# Patient Record
Sex: OTHER/UNKNOWN | Race: WHITE | NOT HISPANIC OR LATINO | Employment: PART TIME | URBAN - METROPOLITAN AREA
[De-identification: names, ages, dates, MRNs, and addresses within clinical notes are randomized per-mention and may not be internally consistent; named-entity substitution may affect disease eponyms.]

---

## 2022-10-12 ENCOUNTER — TELEPHONE (OUTPATIENT)
Dept: PLASTIC SURGERY | Facility: CLINIC | Age: 32
End: 2022-10-12

## 2022-10-12 NOTE — TELEPHONE ENCOUNTER
[facility] :  Care Coordination Note     SITUATION   Angy Lino (she/her) is a 32 year old adult who is receiving support for:  Care Team  .    BACKGROUND     Pt is scheduled for a breast augmentation consult with Dr. Real on 01/11/23.    Pt's father, Nathan Lino, made the appointment and is helping coordinate care. He reports that pt is on the autism spectrum. Pt is also interested in bottom surgery. Writer discussed orchiectomy and vaginoplasty, as well as LOS. Writer added pt to the vaginoplasty waitlist.     ASSESSMENT     Surgery              CGC Assessment  Comprehensive Gender Care ([facility]) Enrollment: Enrolled  Patient has a therapist: Yes  Letter of support #1: Requested  Surgery being considered: Yes  Augmentation: Yes  Hormones at least 12mo: Yes          PLAN          Nursing Interventions:  @FLOW([id number]::1:2)@    Follow-up plan:    1. Obtain AMIE Pack

## 2022-10-13 NOTE — TELEPHONE ENCOUNTER
FUTURE VISIT INFORMATION      FUTURE VISIT INFORMATION:    Date: 1/11/22    Time: 1:30pm    Location: [facility]  REFERRAL INFORMATION:    Reason for visit/diagnosis  gender affirming breast augmentation    RECORDS REQUESTED FROM:       No recs to collect

## 2022-12-07 ENCOUNTER — TELEPHONE (OUTPATIENT)
Dept: PLASTIC SURGERY | Facility: CLINIC | Age: 32
End: 2022-12-07

## 2022-12-07 NOTE — CONFIDENTIAL NOTE
Writer called to schedule vaginoplasty consult, since pt is next on Dr. Alexander's list for new consults. Angy needs to coordinate with family for rides, so she will call back to schedule.

## 2022-12-27 ENCOUNTER — TELEPHONE (OUTPATIENT)
Dept: PLASTIC SURGERY | Facility: CLINIC | Age: 32
End: 2022-12-27

## 2022-12-27 DIAGNOSIS — F64.0 GENDER DYSPHORIA IN ADULT: Primary | ICD-10-CM

## 2022-12-27 NOTE — CONFIDENTIAL NOTE
[facility] :  Care Coordination Note     SITUATION   Angy Lino (she/her) is a 32 year old adult who is receiving support for:  Care Team  .    BACKGROUND     Pt is scheduled for a vaginoplasty consult with Dr. Alexander on 08/15/23.     ASSESSMENT     Surgery              CGC Assessment  Comprehensive Gender Care ([facility]) Enrollment: Enrolled  Patient has a therapist: Yes  Letter of support #1: Requested  Letter of support #2: Requested  Surgery being considered: Yes  Augmentation: Yes  Vaginoplasty: Yes    Pt ended call before writer could do intake questions or ask about health insurance.       PLAN          Nursing Interventions:       Follow-up plan:    1. Obtain LOS  2. Start hair removal, if desired.        Lelo Pack

## 2023-01-11 ENCOUNTER — PRE VISIT (OUTPATIENT)
Dept: PLASTIC SURGERY | Facility: CLINIC | Age: 33
End: 2023-01-11

## 2023-01-11 ENCOUNTER — OFFICE VISIT (OUTPATIENT)
Dept: PLASTIC SURGERY | Facility: CLINIC | Age: 33
End: 2023-01-11
Payer: COMMERCIAL

## 2023-01-11 VITALS
TEMPERATURE: 97.8 F | HEART RATE: 86 BPM | WEIGHT: 178 LBS | OXYGEN SATURATION: 97 % | DIASTOLIC BLOOD PRESSURE: 84 MMHG | BODY MASS INDEX: 28.61 KG/M2 | HEIGHT: 66 IN | SYSTOLIC BLOOD PRESSURE: 148 MMHG

## 2023-01-11 DIAGNOSIS — F64.0 GENDER DYSPHORIA IN ADULT: Primary | ICD-10-CM

## 2023-01-11 PROCEDURE — 99204 OFFICE O/P NEW MOD 45 MIN: CPT | Performed by: STUDENT IN AN ORGANIZED HEALTH CARE EDUCATION/TRAINING PROGRAM

## 2023-01-11 RX ORDER — PROGESTERONE 200 MG/1
200 CAPSULE ORAL DAILY
COMMUNITY
Start: 2022-10-19

## 2023-01-11 RX ORDER — SYRINGE, DISPOSABLE, 1 ML
SYRINGE, EMPTY DISPOSABLE MISCELLANEOUS
COMMUNITY
Start: 2023-01-04

## 2023-01-11 RX ORDER — SPIRONOLACTONE 50 MG/1
50 TABLET, FILM COATED ORAL 2 TIMES DAILY
Status: ON HOLD | COMMUNITY
Start: 2022-10-19 | End: 2024-08-15

## 2023-01-11 RX ORDER — NEEDLES, DISPOSABLE 25GX5/8"
NEEDLE, DISPOSABLE MISCELLANEOUS
COMMUNITY
Start: 2022-12-06

## 2023-01-11 RX ORDER — NEEDLES, DISPOSABLE 25GX5/8"
NEEDLE, DISPOSABLE MISCELLANEOUS
COMMUNITY
Start: 2023-01-05

## 2023-01-11 RX ORDER — ESTRADIOL VALERATE 20 MG/ML
6 INJECTION INTRAMUSCULAR WEEKLY
COMMUNITY
Start: 2022-10-19

## 2023-01-11 ASSESSMENT — PAIN SCALES - GENERAL: PAINLEVEL: MILD PAIN (2)

## 2023-01-11 NOTE — NURSING NOTE
"Chief Complaint   Patient presents with     Consult     Angy, is being seen today for a consult regarding gender affirming breast aug.       Vitals:    01/11/23 1332   BP: (!) 148/84   BP Location: Left arm   Patient Position: Chair   Cuff Size: Adult Regular   Pulse: 86   Temp: 97.8  F (36.6  C)   TempSrc: Oral   SpO2: 97%   Weight: 80.7 kg (178 lb)   Height: 1.676 m (5' 6\")       Body mass index is 28.73 kg/m .      Radha Barrett LPN    "

## 2023-01-11 NOTE — LETTER
"1/11/2023       RE: Angy Lino  [address]  [facility] [address]       Dear Colleague,    Thank you for referring your patient, Angy Lino, to the [facility] PLASTIC AND RECONSTRUCTIVE SURGERY CLINIC [place] at [facility]. Please see a copy of my visit note below.    PRS    HPI: 32 trans female p/f chest feminization consult. Patient has been in her current gender role for >3 years. Estrogen therapy for 2-3 years with plateau in breast growth many months ago. Patient has autism and both supportive parents are in the room with the patient. She would like a breast augmentation to more proportionately feminize the chest. No breast masses, lumps, nipple discharge or armpit swelling. There is a family history of breast cancer with mother having diagnosis later in life, like in the 60s. Patient lives in the [facility]. She has supportive family including parents. She has excellent relationship with therapist of a few years and meets with the therapist weekly. Patient will be available for postoperative care. She brings a letter of support to be reviewed by our .     ROS: Negative, see HPI  PMH: Gender dysphoria, autism, anxiety  PSH: No prior surgeries to the breasts or chest  Medications: Aldactone, Prozac, Estradiol  Allergies: None  SH: Non-smoker, denies any tobacco or nicotine use  FH: No bleeding or clotting issues, or problems with anesthesia. Mother and maternal grandmother with late breast cancer diagnosis and no known genetic mutations.     Examination:  BP (!) 148/84 (BP Location: Left arm, Patient Position: Chair, Cuff Size: Adult Regular)   Pulse 86   Temp 97.8  F (36.6  C) (Oral)   Ht 1.676 m (5' 6\")   Wt 80.7 kg (178 lb)   SpO2 97%   BMI 28.73 kg/m    Nonlabored breathing  Not distressed  Bilateral breasts with no ptosis  No breast masses, lumps, nipple discharge, skin changes or axillary " lymphadenopathy  Breast measurements:  BW: 15 cm bilaterally  NIMF (on-stretch): 6 cm bilaterally    No breast screening imaging    A/P: 32 trans female p/f breast aug consult    -Patient is a good candidate for bilateral feminizing breast augmentation with use of permanent silicone gel implants placed in the dual-plane position  -Discussed at-length both patient goals as well as risks of the operation which include but are not limited to: infection, bleeding, pain, poor scarring, wound healing issues, implant malposition, implant rupture, animation deformity, double-bubble deformity, capsular contracture, CHARLES-ALCL, numbness, asymmetry and need for revisional surgery. Despite these risks, patient consents to and would like to proceed with scheduling for BILATERAL feminizing breast augmentation with use of permanent silicone gel implants.  -Obtained photo consent and preoperative photography today  -Confirm uploaded letter of support for chest surgery  -Will order implants  -Recommend preoperative screening mammography due to age in the setting of strong breast cancer family history  -Case request will be placed  -A total of 45 minutes of direct face-to-face counseling, chart review and documentation was performed with the patient during this encounter          Again, thank you for allowing me to participate in the care of your patient.      Sincerely,    Cam Real MD

## 2023-01-12 NOTE — PROGRESS NOTES
"PRS    HPI: 32 trans female p/f chest feminization consult. Patient has been in her current gender role for >3 years. Estrogen therapy for 2-3 years with plateau in breast growth many months ago. Patient has autism and both supportive parents are in the room with the patient. She would like a breast augmentation to more proportionately feminize the chest. No breast masses, lumps, nipple discharge or armpit swelling. There is a family history of breast cancer with mother having diagnosis later in life, like in the 60s. Patient lives in the [facility]. She has supportive family including parents. She has excellent relationship with therapist of a few years and meets with the therapist weekly. Patient will be available for postoperative care. She brings a letter of support to be reviewed by our .     ROS: Negative, see HPI  PMH: Gender dysphoria, autism, anxiety  PSH: No prior surgeries to the breasts or chest  Medications: Aldactone, Prozac, Estradiol  Allergies: None  SH: Non-smoker, denies any tobacco or nicotine use  FH: No bleeding or clotting issues, or problems with anesthesia. Mother and maternal grandmother with late breast cancer diagnosis and no known genetic mutations.     Examination:  BP (!) 148/84 (BP Location: Left arm, Patient Position: Chair, Cuff Size: Adult Regular)   Pulse 86   Temp 97.8  F (36.6  C) (Oral)   Ht 1.676 m (5' 6\")   Wt 80.7 kg (178 lb)   SpO2 97%   BMI 28.73 kg/m    Nonlabored breathing  Not distressed  Bilateral breasts with no ptosis  No breast masses, lumps, nipple discharge, skin changes or axillary lymphadenopathy  Breast measurements:  BW: 15 cm bilaterally  NIMF (on-stretch): 6 cm bilaterally    No breast screening imaging    A/P: 32 trans female p/f breast aug consult    -Patient is a good candidate for bilateral feminizing breast augmentation with use of permanent silicone gel implants placed in the dual-plane position  -Discussed at-length both patient " goals as well as risks of the operation which include but are not limited to: infection, bleeding, pain, poor scarring, wound healing issues, implant malposition, implant rupture, animation deformity, double-bubble deformity, capsular contracture, CHARLES-ALCL, numbness, asymmetry and need for revisional surgery. Despite these risks, patient consents to and would like to proceed with scheduling for BILATERAL feminizing breast augmentation with use of permanent silicone gel implants.  -Obtained photo consent and preoperative photography today  -Confirm uploaded letter of support for chest surgery  -Will order implants  -Recommend preoperative screening mammography due to age in the setting of strong breast cancer family history  -Case request will be placed  -A total of 45 minutes of direct face-to-face counseling, chart review and documentation was performed with the patient during this encounter    Cam Real MD, PhD

## 2023-01-23 ENCOUNTER — DOCUMENTATION ONLY (OUTPATIENT)
Dept: PLASTIC SURGERY | Facility: CLINIC | Age: 33
End: 2023-01-23
Payer: COMMERCIAL

## 2023-01-23 NOTE — PROGRESS NOTES
[facility] :  Care Coordination Note     SITUATION   Angy Lino is a 32 year old adult who is receiving support for:  No chief complaint on file.  .    BACKGROUND     Reviewed LOS. The letter from Ian would meet criteria, but it indicates it should be disregarded without the second letter from Prakash Rojas. That LOS is insufficient. Writer sent message to Angy's dad to request update from Prakash Rojas.     ASSESSMENT     Surgery              CGC Assessment  Comprehensive [facility] Care ([facility]) Enrollment: Enrolled  Patient has a therapist: Yes  Name of therapist: Ian Soriano  Letter of support #1: Received  Letter #1 Date: 07/01/22  Letter of support #2: Requested  Surgery being considered: Yes  Augmentation: Yes  Vaginoplasty: Yes          PLAN          Nursing Interventions:       Follow-up plan:  Pt to obtain and upload updated LOS. Surgery to be scheduled.        LIVIA MERCADO LPCC

## 2023-01-31 ENCOUNTER — HOSPITAL ENCOUNTER (OUTPATIENT)
Dept: MAMMOGRAPHY | Facility: CLINIC | Age: 33
Discharge: HOME | End: 2023-01-31
Attending: STUDENT IN AN ORGANIZED HEALTH CARE EDUCATION/TRAINING PROGRAM | Admitting: STUDENT IN AN ORGANIZED HEALTH CARE EDUCATION/TRAINING PROGRAM
Payer: COMMERCIAL

## 2023-01-31 DIAGNOSIS — F64.0 GENDER DYSPHORIA IN ADULT: ICD-10-CM

## 2023-01-31 PROCEDURE — 77067 SCR MAMMO BI INCL CAD: CPT

## 2023-02-12 ENCOUNTER — HEALTH MAINTENANCE LETTER (OUTPATIENT)
Age: 33
End: 2023-02-12

## 2023-05-10 ENCOUNTER — TELEPHONE (OUTPATIENT)
Dept: PLASTIC SURGERY | Facility: CLINIC | Age: 33
End: 2023-05-10
Payer: COMMERCIAL

## 2023-05-10 NOTE — CONFIDENTIAL NOTE
Pt's dad, Nathan, called to ask where Angy is in the process to scheduling top surgery. Angy saw Dr. Real on 01/11/23. Crow's previous note reviewing LOS states pt needs to update second LOS from Prakash Rojas because it is insufficient. Writer to email pt's dad at [email or web address] LOS requirements.

## 2023-05-11 ENCOUNTER — PATIENT OUTREACH (OUTPATIENT)
Dept: PLASTIC SURGERY | Facility: CLINIC | Age: 33
End: 2023-05-11
Payer: COMMERCIAL

## 2023-05-11 NOTE — PATIENT INSTRUCTIONS
Reached out to pt today to answer questions about scheduling surgery. Pt stated that she did not have questions but would like me to call her dad, Nathan to see if he has questions. I called Nathan and he let me know that he spoke with Lelo yesterday about updated LOS requirement and he has no further questions. They are working on obtaining a letter from therapist Prakash Rojas.  Jefferson WEST RN

## 2023-05-18 NOTE — TELEPHONE ENCOUNTER
FUTURE VISIT INFORMATION      FUTURE VISIT INFORMATION:    Date: 8/15/2023    Time: Noon    Location: [facility]  REFERRAL INFORMATION:    Referring provider: N/A    Referring providers clinic: N/A    Reason for visit/diagnosis: Vaginoplasty Consult    RECORDS REQUESTED FROM:       Clinic name Comments Records Status Imaging Status   [facility] 1/11/23 - PLASTIC OV with Dr. Real Pending sale to [facility] 4/19/23 - [facility] OV with BRITTNI Benavidez Care Everywhere

## 2023-06-01 ENCOUNTER — TELEPHONE (OUTPATIENT)
Dept: PLASTIC SURGERY | Facility: CLINIC | Age: 33
End: 2023-06-01
Payer: COMMERCIAL

## 2023-06-01 NOTE — CONFIDENTIAL NOTE
Pt's dad, Nathan, called to ask if Angy's updated LOS is in her chart yet. He stated that her provider sent it recently. Writer stated they do not see a recent LOS in pt's chart. Nathan stated he will have the provider send it again.

## 2023-06-02 ENCOUNTER — TELEPHONE (OUTPATIENT)
Dept: PLASTIC SURGERY | Facility: CLINIC | Age: 33
End: 2023-06-02
Payer: COMMERCIAL

## 2023-06-08 NOTE — TELEPHONE ENCOUNTER
Reviewed updated LOS. It meets criteria. Updated pt list and messaged surgeon and RNCC for CR. Once placed, surgery can be scheduled.

## 2023-06-12 ENCOUNTER — TELEPHONE (OUTPATIENT)
Dept: PLASTIC SURGERY | Facility: CLINIC | Age: 33
End: 2023-06-12
Payer: COMMERCIAL

## 2023-06-12 NOTE — TELEPHONE ENCOUNTER
Angy's dad called to check on LOS and progress toward surgery. Writer let him know our periop coordinator would reach out to find a surgery date in the next week or two for Dr. Real.

## 2023-06-15 ENCOUNTER — TELEPHONE (OUTPATIENT)
Dept: PLASTIC SURGERY | Facility: CLINIC | Age: 33
End: 2023-06-15
Payer: COMMERCIAL

## 2023-06-15 PROBLEM — F64.0 GENDER DYSPHORIA IN ADULT: Status: ACTIVE | Noted: 2023-06-15

## 2023-06-15 NOTE — TELEPHONE ENCOUNTER
RN Care Coordinator: Jefferson Vincent; [phone]    Surgery is scheduled with Dr. Real   Date: 7/20   [facility]  Scheduled per: next available date    H&P to be completed by Primary Care team; patient to schedule    Surgical consult: 7/12 with Dr. Real, [facility]    Post-op visit(s):   7/31 with Kendra Coley PA-C, [facility]       Patient will receive a phone call from pre-admission nurses 1-2 days prior to surgery with arrival and start time. Approximate arrival time/surgery time given to patient: 10 AM. Patient aware times are subject to change up until day before surgery.       Spoke with patient and her mother, Atiya, and was able to confirm all scheduled information.       Patient questions/concerns: Patient did get overwhelmed during scheduling call, so message noted for pre-op nurses that patient may want to conference her mother in to go over surgery times.       Surgery packet to be sent via US mail to mother, Atiya, per patient request    Atiya Lino  [address] [address]    __    Lissa Crook, Senior Perioperative Coordinator, on 6/15/2023 at 2:10 PM  P: [phone]

## 2023-06-15 NOTE — TELEPHONE ENCOUNTER
Spoke with patient regarding scheduling surgery with Dr. Real.    Patient requested a call back and ended the call.    Will try again later today.    __    Lissa Crook, Senior Perioperative Coordinator, on 6/15/2023 at 12:42 PM  P: [phone]

## 2023-07-06 DIAGNOSIS — F64.0 GENDER DYSPHORIA IN ADULT: Primary | ICD-10-CM

## 2023-07-06 NOTE — PROGRESS NOTES
Spoke to pt's father, Nathan today who requested assistance with getting pre-op H & P for upcoming surgery as he was unaware this was needed. PAC referral placed per request. Nathan will call today to set up pre-op appt and understands urgency as surgery is scheduled for 7/20.  Jefferson WEST RN

## 2023-07-07 ENCOUNTER — TELEPHONE (OUTPATIENT)
Dept: PLASTIC SURGERY | Facility: CLINIC | Age: 33
End: 2023-07-07
Payer: COMMERCIAL

## 2023-07-07 NOTE — CONFIDENTIAL NOTE
Writer received voicemail from pt's insurance  stating they had questions about upcoming procedure with Dr. Real. Writer called and pt's dad, Nathan, answered. Nathan stated they figured everything out on their end. No follow up needed.

## 2023-07-07 NOTE — TELEPHONE ENCOUNTER
FUTURE VISIT INFORMATION      SURGERY INFORMATION:    Date: 7/20/23    Location:  or    Surgeon:  Cam Real MD    Anesthesia Type:  general    Procedure: BILATERAL FEMINIZING BREAST AUGMENTATION WITH USE OF SMOOTH ROUND SILICONE IMPLANTS    RECORDS REQUESTED FROM:       Primary Care Provider: [facility] "[employer]"

## 2023-07-12 ENCOUNTER — OFFICE VISIT (OUTPATIENT)
Dept: PLASTIC SURGERY | Facility: CLINIC | Age: 33
End: 2023-07-12
Payer: COMMERCIAL

## 2023-07-12 ENCOUNTER — ANESTHESIA EVENT (OUTPATIENT)
Dept: SURGERY | Facility: AMBULATORY SURGERY CENTER | Age: 33
End: 2023-07-12
Payer: COMMERCIAL

## 2023-07-12 ENCOUNTER — PRE VISIT (OUTPATIENT)
Dept: SURGERY | Facility: CLINIC | Age: 33
End: 2023-07-12

## 2023-07-12 VITALS
BODY MASS INDEX: 28.83 KG/M2 | HEART RATE: 97 BPM | WEIGHT: 179.4 LBS | SYSTOLIC BLOOD PRESSURE: 127 MMHG | HEIGHT: 66 IN | DIASTOLIC BLOOD PRESSURE: 74 MMHG | OXYGEN SATURATION: 98 %

## 2023-07-12 DIAGNOSIS — F64.0 GENDER DYSPHORIA IN ADULT: Primary | ICD-10-CM

## 2023-07-12 PROCEDURE — 99213 OFFICE O/P EST LOW 20 MIN: CPT | Performed by: STUDENT IN AN ORGANIZED HEALTH CARE EDUCATION/TRAINING PROGRAM

## 2023-07-12 ASSESSMENT — PAIN SCALES - GENERAL: PAINLEVEL: NO PAIN (0)

## 2023-07-12 NOTE — LETTER
7/12/2023       RE: Angy Lino  [address] [place] [address]       Dear Colleague,    Thank you for referring your patient, Angy Lino, to the [facility] PLASTIC AND RECONSTRUCTIVE SURGERY CLINIC [place] at [facility]. Please see a copy of my visit note below.    PRS    Patient returns for preoperative visit.  Plan will be for bilateral feminizing breast augmentation in the dual plane position with silicone smooth round gel implants.  Patient is agreeable with the plan.     Reiterated risks of implants, including but not limited to: capsular contracture, implant rupture, implant malposition, double bubble deformity, animation deformity, CHARLES-ALCL, implant related illness. Reviewed the FDA checklist for implant related risks.  Patient understands these risks and is appreciative of the discussion.    Reiterated importance of implant surveillance, which includes MR imaging study 5-6 years after the original operation, followed by MR imaging every 3 years thereafter.      Discussed the typical activity restrictions following surgery.  For the first 2 weeks, limit lifting to 5 pounds and no activity that would elevate the heart rate for sustained periods of time above 100 bpm.  We may initiate upper pole strapping at 2 weeks.  Patient is to wear surgical bra at all times.  At the 6-week visit, we will increase activities usually to near full activity including swimming.  At 3 months postoperatively, patient can perform all activities including push-ups.    Discussed the risks of surgery, including but not limited to: Infection, bleeding, pain, poor scarring, hematoma, asymmetry, suboptimal aesthetic result, need for revision surgery, capsular contracture, implant rupture, implant malposition, double bubble deformity, animation deformity, CHARLES-ALCL, implant related illness.  Despite these risks, patient consents and would like to proceed  with bilateral feminizing breast augmentation in the partial submuscular or dual plane position using silicone smooth round gel implants.  All questions answered.    A total of 20 minutes was devoted to review of chart, direct face-to-face patient counseling and documentation during this encounter, exclusive of any procedure performed.          Again, thank you for allowing me to participate in the care of your patient.      Sincerely,    Cam Real MD

## 2023-07-12 NOTE — TELEPHONE ENCOUNTER
FUTURE VISIT INFORMATION        SURGERY INFORMATION:    Date: 7/20/23    Location:  or    Surgeon:  Cam Real MD    Anesthesia Type:  general    Procedure: BILATERAL FEMINIZING BREAST AUGMENTATION WITH USE OF SMOOTH ROUND SILICONE IMPLANTS     RECORDS REQUESTED FROM:         Primary Care Provider: [facility]

## 2023-07-12 NOTE — NURSING NOTE
"Chief Complaint   Patient presents with     Consult     Consult breast augmentation       Vitals:    07/12/23 1556   BP: 127/74   BP Location: Left arm   Patient Position: Sitting   Cuff Size: Adult Regular   Pulse: 97   SpO2: 98%   Weight: 179 lb 6.4 oz   Height: 5' 6\"       Body mass index is 28.96 kg/m .    Meenakshi Rogers CMA    "

## 2023-07-12 NOTE — PROGRESS NOTES
PRS    Patient returns for preoperative visit.  Plan will be for bilateral feminizing breast augmentation in the dual plane position with silicone smooth round gel implants.  Patient is agreeable with the plan.     Reiterated risks of implants, including but not limited to: capsular contracture, implant rupture, implant malposition, double bubble deformity, animation deformity, CHARLES-ALCL, implant related illness. Reviewed the FDA checklist for implant related risks.  Patient understands these risks and is appreciative of the discussion.    Reiterated importance of implant surveillance, which includes MR imaging study 5-6 years after the original operation, followed by MR imaging every 3 years thereafter.      Discussed the typical activity restrictions following surgery.  For the first 2 weeks, limit lifting to 5 pounds and no activity that would elevate the heart rate for sustained periods of time above 100 bpm.  We may initiate upper pole strapping at 2 weeks.  Patient is to wear surgical bra at all times.  At the 6-week visit, we will increase activities usually to near full activity including swimming.  At 3 months postoperatively, patient can perform all activities including push-ups.    Discussed the risks of surgery, including but not limited to: Infection, bleeding, pain, poor scarring, hematoma, asymmetry, suboptimal aesthetic result, need for revision surgery, capsular contracture, implant rupture, implant malposition, double bubble deformity, animation deformity, CHARLES-ALCL, implant related illness.  Despite these risks, patient consents and would like to proceed with bilateral feminizing breast augmentation in the partial submuscular or dual plane position using silicone smooth round gel implants.  All questions answered.    A total of 20 minutes was devoted to review of chart, direct face-to-face patient counseling and documentation during this encounter, exclusive of any procedure performed.    Cam  MD Addie, PhD

## 2023-07-14 ENCOUNTER — OFFICE VISIT (OUTPATIENT)
Dept: SURGERY | Facility: CLINIC | Age: 33
End: 2023-07-14
Payer: COMMERCIAL

## 2023-07-14 ENCOUNTER — PRE VISIT (OUTPATIENT)
Dept: SURGERY | Facility: CLINIC | Age: 33
End: 2023-07-14

## 2023-07-14 VITALS
HEIGHT: 66 IN | RESPIRATION RATE: 16 BRPM | SYSTOLIC BLOOD PRESSURE: 120 MMHG | TEMPERATURE: 97.9 F | WEIGHT: 179.9 LBS | BODY MASS INDEX: 28.91 KG/M2 | HEART RATE: 72 BPM | DIASTOLIC BLOOD PRESSURE: 82 MMHG | OXYGEN SATURATION: 94 %

## 2023-07-14 DIAGNOSIS — Z01.818 PREOP EXAMINATION: Primary | ICD-10-CM

## 2023-07-14 DIAGNOSIS — F64.0 GENDER DYSPHORIA IN ADULT: ICD-10-CM

## 2023-07-14 PROCEDURE — 99203 OFFICE O/P NEW LOW 30 MIN: CPT | Performed by: PHYSICIAN ASSISTANT

## 2023-07-14 RX ORDER — ACETAMINOPHEN 325 MG/1
325-650 TABLET ORAL EVERY 6 HOURS PRN
COMMUNITY

## 2023-07-14 ASSESSMENT — PAIN SCALES - GENERAL: PAINLEVEL: NO PAIN (0)

## 2023-07-14 ASSESSMENT — LIFESTYLE VARIABLES: TOBACCO_USE: 0

## 2023-07-14 ASSESSMENT — ENCOUNTER SYMPTOMS: SEIZURES: 0

## 2023-07-14 NOTE — PATIENT INSTRUCTIONS
Preparing for Your Surgery      Name:  Angy Lino   MRN:  [id number]   :  1990   Today's Date:  2023         Arriving for surgery:  Surgery date:  2023  Arrival time:  5:45 AM    Restrictions due to COVID 19:    Please maintain social distance.  Masking is optional        parking is available for anyone with mobility limitations or disabilities. (Monday- Friday 7 am- 5 pm)    Please come to:    [facility]  [address] [place] [id number]    Check in on the 5th floor, Ambulatory Surgery Center.    What can I eat or drink?    -  You may eat and drink normally until 8 hours before arrival time  (Until 9:45 PM )  -  You may have clear liquids until 2 hours before arrival time  (Until 3:45 AM)    Examples of clear liquids:  Water  Clear broth  Juices (apple, white grape, white cranberry  and cider) without pulp  Noncarbonated, powder based beverages  (lemonade and Brian-Aid)  Sodas (Sprite, 7-Up, ginger ale and seltzer)  Coffee or tea (without milk or cream)  Gatorade    --No alcohol or cannabis products for at least 24 hours before surgery    Which medicines can I take?    Hold Aspirin for 7 days before surgery.   Hold Multivitamins for 7 days before surgery.  Hold Supplements for 7 days before surgery.  Hold Ibuprofen (Advil, Motrin) for 1 day before surgery--unless otherwise directed by surgeon.  Hold Naproxen (Aleve) for 4 days before surgery.    -  DO NOT take the following medications the day of surgery:  Spironalactone    -  PLEASE TAKE the following medications the day of surgery   Tylenol if needed  Prozac  Progesterone    How do I prepare myself?  - Please take 2 showers (one the night prior to surgery and one the morning of surgery) using Scrubcare or Hibiclens soap.    Use this soap only from the neck to your toes.     Leave the soap on your skin for one minute--then rinse thoroughly.      You may use your own shampoo and conditioner; no  other hair products.   - Please remove all jewelry and body piercings.  - No lotions, deodorants or fragrance.  - No makeup or fingernail polish.   - Bring your ID and insurance card.    -If you have a Deep Brain Stimulator, a Spinal Cord Stimulator or any implanted Neuro device you must bring the remote to the Surgery Center          ALL PATIENTS ARE REQUIRED TO HAVE A RESPONSIBLE ADULT TO DRIVE AND BE IN ATTENDANCE WITH THEM FOR 24 HOURS FOLLOWING SURGERY       Covid testing policy as of 12/06/2022  Your surgeon will notify and schedule you for a COVID test if one is needed before surgery--please direct any questions or COVID symptoms to your surgeon      Questions or Concerns:    -For questions regarding the day of surgery please contact the Ambulatory Surgery Center at [phone].    -If you have health changes between today and your surgery please contact your surgeon.     For questions after surgery please call your surgeons office.

## 2023-07-14 NOTE — H&P
Pre-Operative H & P     CC:  Preoperative exam to assess for increased cardiopulmonary risk while undergoing surgery and anesthesia.    Date of Encounter: 7/14/2023  Primary Care Physician:  Cam Real     Reason for visit:   Encounter Diagnoses   Name Primary?     Preop examination Yes     Gender dysphoria in adult        HPI  Angy Lino is a 32 year old adult who presents for pre-operative H & P in preparation for  Procedure Information     Case: [id number] Date/Time: 07/20/23 0715    Procedure: BILATERAL FEMINIZING BREAST AUGMENTATION WITH USE OF SMOOTH ROUND SILICONE IMPLANTS (Bilateral: Breast)    Anesthesia type: General    Diagnosis: Gender dysphoria in adult [F64.0]    Pre-op diagnosis: Gender dysphoria in adult [F64.0]    Location: [place] [address] / [facility]    Providers: Cam Real MD          Ms. Lino is a 32-year-old transgender female with past medical history significant for autism spectrum disorder and anxiety.  To further align with her gender identity she has chosen to pursue the above procedure.    History is obtained from the patient and chart review    Hx of abnormal bleeding or anti-platelet use: Denies      Past Medical History  Past Medical History:   Diagnosis Date     Anxiety      Autism        Past Surgical History  No past surgical history on file.    Prior to Admission Medications  Current Outpatient Medications   Medication Sig Dispense Refill     acetaminophen (TYLENOL) 325 MG tablet Take 325-650 mg by mouth every 6 hours as needed for mild pain       estradiol valerate (DELESTROGEN) 20 MG/ML injection Inject 6 mg into the muscle once a week Mondays or Thursdays       FLUoxetine (PROZAC) 20 MG capsule Take 1 capsule by mouth every morning       progesterone (PROMETRIUM) 200 MG capsule Take 1 capsule by mouth 2 times daily       spironolactone (ALDACTONE) 50 MG tablet Take 50 mg by mouth 2 times daily        "B-D DISP NEEDLE 25G X 1\" MISC USE FOR WEEKLY INJECTION       B-D SYRINGE LUER-KAYLEIGH 1 ML MISC USE FOR WEEKLY ADMINISTRATION       BD DISP NEEDLES 18G X 1-1/2\" MISC USE ONCE WEEKLY         Allergies  No Known Allergies    Social History  Social History     Socioeconomic History     Marital status: Single     Spouse name: Not on file     Number of children: Not on file     Years of education: Not on file     Highest education level: Not on file   Occupational History     Not on file   Tobacco Use     Smoking status: Never     Smokeless tobacco: Never   Substance and Sexual Activity     Alcohol use: Not Currently     Drug use: Never     Sexual activity: Not on file   Other Topics Concern     Not on file   Social History Narrative     Not on file     Social Determinants of Health     Financial Resource Strain: Not on file   Food Insecurity: Not on file   Transportation Needs: Not on file   Physical Activity: Not on file   Stress: Not on file   Social Connections: Not on file   Intimate Partner Violence: Not on file   Housing Stability: Not on file       Family History  Family History   Problem Relation Age of Onset     Anesthesia Reaction No family hx of      Venous thrombosis No family hx of        Review of Systems  The complete review of systems is negative other than noted in the HPI or here.     Anesthesia Evaluation   Pt has not had prior anesthetic         ROS/MED HX  ENT/Pulmonary:  - neg pulmonary ROS  (-) tobacco use and asthma   Neurologic: Comment: Autism spectrum disorder   (-) no seizures and no CVA   Cardiovascular:  - neg cardiovascular ROS     METS/Exercise Tolerance: >4 METS    Hematologic:  - neg hematologic  ROS     Musculoskeletal:  - neg musculoskeletal ROS     GI/Hepatic:  - neg GI/hepatic ROS     Renal/Genitourinary:  - neg Renal ROS     Endo:  - neg endo ROS     Psychiatric/Substance Use:     (+) psychiatric history anxiety (panic attacks)     Infectious Disease:  - neg infectious disease ROS   " "  Malignancy:  - neg malignancy ROS     Other:  - neg other ROS          /82 (BP Location: Right arm, Patient Position: Sitting, Cuff Size: Adult Regular)   Pulse 72   Temp 97.9  F (36.6  C) (Oral)   Resp 16   Ht 1.676 m (5' 6\")   Wt 81.6 kg (179 lb 14.4 oz)   LMP  (LMP Unknown)   SpO2 94%   Breastfeeding No   BMI 29.04 kg/m      Physical Exam   Constitutional: Awake, alert, cooperative, no apparent distress, and appears stated age.  Eyes: Pupils equal, round and reactive to light, extra ocular muscles intact, sclera clear, conjunctiva normal.  HENT: Normocephalic, oral pharynx with moist mucus membranes, good dentition. No goiter appreciated.   Respiratory: Clear to auscultation bilaterally, no crackles or wheezing.  Cardiovascular: Regular rate and rhythm, normal S1 and S2, and no murmur noted.  No edema. Palpable pulses to radial and PT arteries.   GI: Not assessed  Lymph/Hematologic: No cervical lymphadenopathy and no supraclavicular lymphadenopathy.  Genitourinary:  deferred  Skin: Warm and dry.  No rashes at anticipated surgical site.   Musculoskeletal: Full ROM of neck. There is no redness, warmth, or swelling of the joints. Gross motor strength is normal.    Neurologic: Awake, alert, oriented to name, place and time. Cranial nerves II-XII are grossly intact.   Neuropsychiatric: Calm, cooperative. Normal affect.     Prior Labs/Diagnostic Studies   All labs and imaging personally reviewed     Outside labs 2/25/2022  Sodium 136 - 145 mmol/L 137  [facility]   Potassium 3.5 - 5.1 mmol/L 4.7  [facility]   Chloride 98 - 109 mmol/L 105  [facility]   CO2 20 - 29 mmol/L 23  [facility]   Anion Gap 7 - 16 mmol/L 9  [facility]   Calcium 8.4 - 10.4 mg/dL 9.0  [facility]   BUN 7 - 26 mg/dL 13  [facility]   Creatinine 0.55 - 1.18 mg/dL 0.69  [facility]   GFR, Estimated   [facility]   Comment: Unable to Calculate. " "Patient Age/Sex Unknown.   Glucose 70 - 100 mg/dL 78  [facility]   Comment: The given reference range is for the fasting state. Non-fasting reference range for glucose is 70 - 180 mg/dL.   Hours Fasting  4         EKG/ stress test - if available please see in ROS above       The patient's records and results personally reviewed by this provider.     Outside records reviewed from: Care Everywhere   Reviewed surgeon's clinic notes as well as outside primary care clinic notes    LAB/DIAGNOSTIC STUDIES TODAY: None    Assessment      Angy Lino is a 32 year old adult seen as a PAC referral for risk assessment and optimization for anesthesia.    Plan/Recommendations  Pt will be optimized for the proposed procedure.  See below for details on the assessment, risk, and preoperative recommendations    NEUROLOGY  - No history of TIA, CVA or seizure  -Autism spectrum disorder  -Post Op delirium risk factors:  No risk identified    ENT  - No current airway concerns.  Will need to be reassessed day of surgery.  Mallampati: II  TM: > 3    CARDIAC  - No history of CAD, Hypertension and Afib   -Denies  - METS (Metabolic Equivalents)  Patient performs 4 or more METS exercise without symptoms            Total Score: 0      RCRI-Very low risk: Class 1 0.4% complication rate            Total Score: 0        PULMONARY    ALBERTO Low Risk            Total Score: 0      - Denies asthma or inhaler use  - Tobacco History      History   Smoking Status     Never   Smokeless Tobacco     Never       GI  - Denies GERD  PONV High Risk  Total Score: 3           1 AN PONV: Pt is Female    1 AN PONV: Patient is not a current smoker    1 AN PONV: Intended Post Op Opioids        /RENAL  - Baseline Creatinine 0.69    ENDOCRINE    - BMI: Estimated body mass index is 29.04 kg/m  as calculated from the following:    Height as of this encounter: 1.676 m (5' 6\").    Weight as of this encounter: 81.6 kg (179 lb 14.4 oz).  Overweight (BMI " 25.0-29.9)  - No history of Diabetes Mellitus    HEME  VTE Low Risk 0.26%            Total Score: 0      - No history of abnormal bleeding or antiplatelet use.        PSYCH  -Anxiety, continue Prozac    Different anesthesia methods/types have been discussed with the patient, but they are aware that the final plan will be decided by the assigned anesthesia provider on the date of service.      The patient is optimized for their procedure. AVS with information on surgery time/arrival time, meds and NPO status given by nursing staff. No further diagnostic testing indicated.        Qi Perkins PA-C  Preoperative Assessment Center  [facility]  Clinic and Surgery Center  Phone: [phone]  Fax: [phone]

## 2023-07-14 NOTE — H&P (VIEW-ONLY)
Pre-Operative H & P     CC:  Preoperative exam to assess for increased cardiopulmonary risk while undergoing surgery and anesthesia.    Date of Encounter: 7/14/2023  Primary Care Physician:  Cam Real     Reason for visit:   Encounter Diagnoses   Name Primary?     Preop examination Yes     Gender dysphoria in adult        HPI  Angy Lino is a 32 year old adult who presents for pre-operative H & P in preparation for  Procedure Information     Case: [id number] Date/Time: 07/20/23 0715    Procedure: BILATERAL FEMINIZING BREAST AUGMENTATION WITH USE OF SMOOTH ROUND SILICONE IMPLANTS (Bilateral: Breast)    Anesthesia type: General    Diagnosis: Gender dysphoria in adult [F64.0]    Pre-op diagnosis: Gender dysphoria in adult [F64.0]    Location: [place] [address] / [facility]    Providers: Cam Real MD          Ms. Lino is a 32-year-old transgender female with past medical history significant for autism spectrum disorder and anxiety.  To further align with her gender identity she has chosen to pursue the above procedure.    History is obtained from the patient and chart review    Hx of abnormal bleeding or anti-platelet use: Denies      Past Medical History  Past Medical History:   Diagnosis Date     Anxiety      Autism        Past Surgical History  No past surgical history on file.    Prior to Admission Medications  Current Outpatient Medications   Medication Sig Dispense Refill     acetaminophen (TYLENOL) 325 MG tablet Take 325-650 mg by mouth every 6 hours as needed for mild pain       estradiol valerate (DELESTROGEN) 20 MG/ML injection Inject 6 mg into the muscle once a week Mondays or Thursdays       FLUoxetine (PROZAC) 20 MG capsule Take 1 capsule by mouth every morning       progesterone (PROMETRIUM) 200 MG capsule Take 1 capsule by mouth 2 times daily       spironolactone (ALDACTONE) 50 MG tablet Take 50 mg by mouth 2 times daily        "B-D DISP NEEDLE 25G X 1\" MISC USE FOR WEEKLY INJECTION       B-D SYRINGE LUER-KAYLEIGH 1 ML MISC USE FOR WEEKLY ADMINISTRATION       BD DISP NEEDLES 18G X 1-1/2\" MISC USE ONCE WEEKLY         Allergies  No Known Allergies    Social History  Social History     Socioeconomic History     Marital status: Single     Spouse name: Not on file     Number of children: Not on file     Years of education: Not on file     Highest education level: Not on file   Occupational History     Not on file   Tobacco Use     Smoking status: Never     Smokeless tobacco: Never   Substance and Sexual Activity     Alcohol use: Not Currently     Drug use: Never     Sexual activity: Not on file   Other Topics Concern     Not on file   Social History Narrative     Not on file     Social Determinants of Health     Financial Resource Strain: Not on file   Food Insecurity: Not on file   Transportation Needs: Not on file   Physical Activity: Not on file   Stress: Not on file   Social Connections: Not on file   Intimate Partner Violence: Not on file   Housing Stability: Not on file       Family History  Family History   Problem Relation Age of Onset     Anesthesia Reaction No family hx of      Venous thrombosis No family hx of        Review of Systems  The complete review of systems is negative other than noted in the HPI or here.     Anesthesia Evaluation   Pt has not had prior anesthetic         ROS/MED HX  ENT/Pulmonary:  - neg pulmonary ROS  (-) tobacco use and asthma   Neurologic: Comment: Autism spectrum disorder   (-) no seizures and no CVA   Cardiovascular:  - neg cardiovascular ROS     METS/Exercise Tolerance: >4 METS    Hematologic:  - neg hematologic  ROS     Musculoskeletal:  - neg musculoskeletal ROS     GI/Hepatic:  - neg GI/hepatic ROS     Renal/Genitourinary:  - neg Renal ROS     Endo:  - neg endo ROS     Psychiatric/Substance Use:     (+) psychiatric history anxiety (panic attacks)     Infectious Disease:  - neg infectious disease ROS   " "  Malignancy:  - neg malignancy ROS     Other:  - neg other ROS          /82 (BP Location: Right arm, Patient Position: Sitting, Cuff Size: Adult Regular)   Pulse 72   Temp 97.9  F (36.6  C) (Oral)   Resp 16   Ht 1.676 m (5' 6\")   Wt 81.6 kg (179 lb 14.4 oz)   LMP  (LMP Unknown)   SpO2 94%   Breastfeeding No   BMI 29.04 kg/m      Physical Exam   Constitutional: Awake, alert, cooperative, no apparent distress, and appears stated age.  Eyes: Pupils equal, round and reactive to light, extra ocular muscles intact, sclera clear, conjunctiva normal.  HENT: Normocephalic, oral pharynx with moist mucus membranes, good dentition. No goiter appreciated.   Respiratory: Clear to auscultation bilaterally, no crackles or wheezing.  Cardiovascular: Regular rate and rhythm, normal S1 and S2, and no murmur noted.  No edema. Palpable pulses to radial and PT arteries.   GI: Not assessed  Lymph/Hematologic: No cervical lymphadenopathy and no supraclavicular lymphadenopathy.  Genitourinary:  deferred  Skin: Warm and dry.  No rashes at anticipated surgical site.   Musculoskeletal: Full ROM of neck. There is no redness, warmth, or swelling of the joints. Gross motor strength is normal.    Neurologic: Awake, alert, oriented to name, place and time. Cranial nerves II-XII are grossly intact.   Neuropsychiatric: Calm, cooperative. Normal affect.     Prior Labs/Diagnostic Studies   All labs and imaging personally reviewed     Outside labs 2/25/2022  Sodium 136 - 145 mmol/L 137  [facility]   Potassium 3.5 - 5.1 mmol/L 4.7  [facility]   Chloride 98 - 109 mmol/L 105  [facility]   CO2 20 - 29 mmol/L 23  [facility]   Anion Gap 7 - 16 mmol/L 9  [facility]   Calcium 8.4 - 10.4 mg/dL 9.0  [facility]   BUN 7 - 26 mg/dL 13  [facility]   Creatinine 0.55 - 1.18 mg/dL 0.69  [facility]   GFR, Estimated   [facility]   Comment: Unable to Calculate. " "Patient Age/Sex Unknown.   Glucose 70 - 100 mg/dL 78  [facility]   Comment: The given reference range is for the fasting state. Non-fasting reference range for glucose is 70 - 180 mg/dL.   Hours Fasting  4         EKG/ stress test - if available please see in ROS above       The patient's records and results personally reviewed by this provider.     Outside records reviewed from: Care Everywhere   Reviewed surgeon's clinic notes as well as outside primary care clinic notes    LAB/DIAGNOSTIC STUDIES TODAY: None    Assessment      Angy Lino is a 32 year old adult seen as a PAC referral for risk assessment and optimization for anesthesia.    Plan/Recommendations  Pt will be optimized for the proposed procedure.  See below for details on the assessment, risk, and preoperative recommendations    NEUROLOGY  - No history of TIA, CVA or seizure  -Autism spectrum disorder  -Post Op delirium risk factors:  No risk identified    ENT  - No current airway concerns.  Will need to be reassessed day of surgery.  Mallampati: II  TM: > 3    CARDIAC  - No history of CAD, Hypertension and Afib   -Denies  - METS (Metabolic Equivalents)  Patient performs 4 or more METS exercise without symptoms            Total Score: 0      RCRI-Very low risk: Class 1 0.4% complication rate            Total Score: 0        PULMONARY    ALBERTO Low Risk            Total Score: 0      - Denies asthma or inhaler use  - Tobacco History      History   Smoking Status     Never   Smokeless Tobacco     Never       GI  - Denies GERD  PONV High Risk  Total Score: 3           1 AN PONV: Pt is Female    1 AN PONV: Patient is not a current smoker    1 AN PONV: Intended Post Op Opioids        /RENAL  - Baseline Creatinine 0.69    ENDOCRINE    - BMI: Estimated body mass index is 29.04 kg/m  as calculated from the following:    Height as of this encounter: 1.676 m (5' 6\").    Weight as of this encounter: 81.6 kg (179 lb 14.4 oz).  Overweight (BMI " 25.0-29.9)  - No history of Diabetes Mellitus    HEME  VTE Low Risk 0.26%            Total Score: 0      - No history of abnormal bleeding or antiplatelet use.        PSYCH  -Anxiety, continue Prozac    Different anesthesia methods/types have been discussed with the patient, but they are aware that the final plan will be decided by the assigned anesthesia provider on the date of service.      The patient is optimized for their procedure. AVS with information on surgery time/arrival time, meds and NPO status given by nursing staff. No further diagnostic testing indicated.        Qi Perkins PA-C  Preoperative Assessment Center  [facility]  Clinic and Surgery Center  Phone: [phone]  Fax: [phone]

## 2023-07-20 ENCOUNTER — HOSPITAL ENCOUNTER (OUTPATIENT)
Facility: AMBULATORY SURGERY CENTER | Age: 33
Discharge: HOME | End: 2023-07-20
Attending: STUDENT IN AN ORGANIZED HEALTH CARE EDUCATION/TRAINING PROGRAM
Payer: COMMERCIAL

## 2023-07-20 ENCOUNTER — ANESTHESIA (OUTPATIENT)
Dept: SURGERY | Facility: AMBULATORY SURGERY CENTER | Age: 33
End: 2023-07-20
Payer: COMMERCIAL

## 2023-07-20 VITALS
TEMPERATURE: 97.8 F | OXYGEN SATURATION: 98 % | RESPIRATION RATE: 16 BRPM | SYSTOLIC BLOOD PRESSURE: 119 MMHG | WEIGHT: 179.9 LBS | HEART RATE: 86 BPM | DIASTOLIC BLOOD PRESSURE: 83 MMHG | BODY MASS INDEX: 28.91 KG/M2 | HEIGHT: 66 IN

## 2023-07-20 DIAGNOSIS — F64.0 GENDER DYSPHORIA IN ADULT: ICD-10-CM

## 2023-07-20 PROCEDURE — 19325 BREAST AUGMENTATION W/IMPLT: CPT | Mod: 50 | Performed by: STUDENT IN AN ORGANIZED HEALTH CARE EDUCATION/TRAINING PROGRAM

## 2023-07-20 PROCEDURE — 19325 BREAST AUGMENTATION W/IMPLT: CPT | Mod: LT

## 2023-07-20 RX ORDER — GABAPENTIN 300 MG/1
300 CAPSULE ORAL
Status: COMPLETED | OUTPATIENT
Start: 2023-07-20 | End: 2023-07-20

## 2023-07-20 RX ORDER — ONDANSETRON 2 MG/ML
4 INJECTION INTRAMUSCULAR; INTRAVENOUS EVERY 30 MIN PRN
Status: DISCONTINUED | OUTPATIENT
Start: 2023-07-20 | End: 2023-07-21 | Stop reason: HOSPADM

## 2023-07-20 RX ORDER — ACETAMINOPHEN 325 MG/1
975 TABLET ORAL ONCE
Status: COMPLETED | OUTPATIENT
Start: 2023-07-20 | End: 2023-07-20

## 2023-07-20 RX ORDER — HYDROMORPHONE HYDROCHLORIDE 1 MG/ML
0.2 INJECTION, SOLUTION INTRAMUSCULAR; INTRAVENOUS; SUBCUTANEOUS EVERY 5 MIN PRN
Status: DISCONTINUED | OUTPATIENT
Start: 2023-07-20 | End: 2023-07-21 | Stop reason: HOSPADM

## 2023-07-20 RX ORDER — OXYCODONE HYDROCHLORIDE 5 MG/1
5 TABLET ORAL
Status: DISCONTINUED | OUTPATIENT
Start: 2023-07-20 | End: 2023-07-21 | Stop reason: HOSPADM

## 2023-07-20 RX ORDER — SODIUM CHLORIDE, SODIUM LACTATE, POTASSIUM CHLORIDE, CALCIUM CHLORIDE 600; 310; 30; 20 MG/100ML; MG/100ML; MG/100ML; MG/100ML
INJECTION, SOLUTION INTRAVENOUS CONTINUOUS
Status: DISCONTINUED | OUTPATIENT
Start: 2023-07-20 | End: 2023-07-21 | Stop reason: HOSPADM

## 2023-07-20 RX ORDER — ONDANSETRON 4 MG/1
4 TABLET, FILM COATED ORAL EVERY 6 HOURS PRN
Qty: 12 TABLET | Refills: 0 | Status: SHIPPED | OUTPATIENT
Start: 2023-07-20

## 2023-07-20 RX ORDER — ONDANSETRON 2 MG/ML
INJECTION INTRAMUSCULAR; INTRAVENOUS PRN
Status: DISCONTINUED | OUTPATIENT
Start: 2023-07-20 | End: 2023-07-20

## 2023-07-20 RX ORDER — LIDOCAINE 40 MG/G
CREAM TOPICAL
Status: DISCONTINUED | OUTPATIENT
Start: 2023-07-20 | End: 2023-07-21 | Stop reason: HOSPADM

## 2023-07-20 RX ORDER — PROPOFOL 10 MG/ML
INJECTION, EMULSION INTRAVENOUS CONTINUOUS PRN
Status: DISCONTINUED | OUTPATIENT
Start: 2023-07-20 | End: 2023-07-20

## 2023-07-20 RX ORDER — SODIUM CHLORIDE, SODIUM LACTATE, POTASSIUM CHLORIDE, CALCIUM CHLORIDE 600; 310; 30; 20 MG/100ML; MG/100ML; MG/100ML; MG/100ML
INJECTION, SOLUTION INTRAVENOUS CONTINUOUS PRN
Status: DISCONTINUED | OUTPATIENT
Start: 2023-07-20 | End: 2023-07-20

## 2023-07-20 RX ORDER — ALBUTEROL SULFATE 0.83 MG/ML
2.5 SOLUTION RESPIRATORY (INHALATION) EVERY 4 HOURS PRN
Status: DISCONTINUED | OUTPATIENT
Start: 2023-07-20 | End: 2023-07-21 | Stop reason: HOSPADM

## 2023-07-20 RX ORDER — DEXAMETHASONE SODIUM PHOSPHATE 10 MG/ML
4 INJECTION, SOLUTION INTRAMUSCULAR; INTRAVENOUS
Status: DISCONTINUED | OUTPATIENT
Start: 2023-07-20 | End: 2023-07-21 | Stop reason: HOSPADM

## 2023-07-20 RX ORDER — MEPERIDINE HYDROCHLORIDE 25 MG/ML
12.5 INJECTION INTRAMUSCULAR; INTRAVENOUS; SUBCUTANEOUS EVERY 5 MIN PRN
Status: DISCONTINUED | OUTPATIENT
Start: 2023-07-20 | End: 2023-07-21 | Stop reason: HOSPADM

## 2023-07-20 RX ORDER — OXYCODONE HYDROCHLORIDE 5 MG/1
10 TABLET ORAL
Status: DISCONTINUED | OUTPATIENT
Start: 2023-07-20 | End: 2023-07-21 | Stop reason: HOSPADM

## 2023-07-20 RX ORDER — DIAZEPAM 10 MG/2ML
2.5 INJECTION, SOLUTION INTRAMUSCULAR; INTRAVENOUS
Status: DISCONTINUED | OUTPATIENT
Start: 2023-07-20 | End: 2023-07-21 | Stop reason: HOSPADM

## 2023-07-20 RX ORDER — DEXAMETHASONE SODIUM PHOSPHATE 4 MG/ML
INJECTION, SOLUTION INTRA-ARTICULAR; INTRALESIONAL; INTRAMUSCULAR; INTRAVENOUS; SOFT TISSUE PRN
Status: DISCONTINUED | OUTPATIENT
Start: 2023-07-20 | End: 2023-07-20

## 2023-07-20 RX ORDER — HALOPERIDOL 5 MG/ML
1 INJECTION INTRAMUSCULAR
Status: DISCONTINUED | OUTPATIENT
Start: 2023-07-20 | End: 2023-07-21 | Stop reason: HOSPADM

## 2023-07-20 RX ORDER — PROPOFOL 10 MG/ML
INJECTION, EMULSION INTRAVENOUS PRN
Status: DISCONTINUED | OUTPATIENT
Start: 2023-07-20 | End: 2023-07-20

## 2023-07-20 RX ORDER — CEFAZOLIN SODIUM 2 G/50ML
2 SOLUTION INTRAVENOUS
Status: DISCONTINUED | OUTPATIENT
Start: 2023-07-20 | End: 2023-07-21 | Stop reason: HOSPADM

## 2023-07-20 RX ORDER — OXYCODONE HYDROCHLORIDE 5 MG/1
5 TABLET ORAL EVERY 6 HOURS PRN
Qty: 12 TABLET | Refills: 0 | Status: SHIPPED | OUTPATIENT
Start: 2023-07-20 | End: 2023-07-23

## 2023-07-20 RX ORDER — ONDANSETRON 4 MG/1
4 TABLET, ORALLY DISINTEGRATING ORAL EVERY 30 MIN PRN
Status: DISCONTINUED | OUTPATIENT
Start: 2023-07-20 | End: 2023-07-21 | Stop reason: HOSPADM

## 2023-07-20 RX ORDER — METHOCARBAMOL 500 MG/1
500 TABLET, FILM COATED ORAL 4 TIMES DAILY PRN
Qty: 12 TABLET | Refills: 0 | Status: SHIPPED | OUTPATIENT
Start: 2023-07-20

## 2023-07-20 RX ORDER — HYDROMORPHONE HYDROCHLORIDE 1 MG/ML
0.4 INJECTION, SOLUTION INTRAMUSCULAR; INTRAVENOUS; SUBCUTANEOUS EVERY 5 MIN PRN
Status: DISCONTINUED | OUTPATIENT
Start: 2023-07-20 | End: 2023-07-21 | Stop reason: HOSPADM

## 2023-07-20 RX ORDER — CEPHALEXIN 500 MG/1
500 CAPSULE ORAL 4 TIMES DAILY
Qty: 12 CAPSULE | Refills: 0 | Status: ON HOLD | OUTPATIENT
Start: 2023-07-20 | End: 2024-08-15

## 2023-07-20 RX ORDER — CEFAZOLIN SODIUM 2 G/50ML
2 SOLUTION INTRAVENOUS SEE ADMIN INSTRUCTIONS
Status: DISCONTINUED | OUTPATIENT
Start: 2023-07-20 | End: 2023-07-21 | Stop reason: HOSPADM

## 2023-07-20 RX ORDER — KETOROLAC TROMETHAMINE 30 MG/ML
15 INJECTION, SOLUTION INTRAMUSCULAR; INTRAVENOUS
Status: DISCONTINUED | OUTPATIENT
Start: 2023-07-20 | End: 2023-07-21 | Stop reason: HOSPADM

## 2023-07-20 RX ORDER — FENTANYL CITRATE 50 UG/ML
50 INJECTION, SOLUTION INTRAMUSCULAR; INTRAVENOUS EVERY 5 MIN PRN
Status: DISCONTINUED | OUTPATIENT
Start: 2023-07-20 | End: 2023-07-21 | Stop reason: HOSPADM

## 2023-07-20 RX ORDER — LABETALOL HYDROCHLORIDE 5 MG/ML
10 INJECTION, SOLUTION INTRAVENOUS
Status: DISCONTINUED | OUTPATIENT
Start: 2023-07-20 | End: 2023-07-21 | Stop reason: HOSPADM

## 2023-07-20 RX ORDER — LIDOCAINE HYDROCHLORIDE 20 MG/ML
INJECTION, SOLUTION INFILTRATION; PERINEURAL PRN
Status: DISCONTINUED | OUTPATIENT
Start: 2023-07-20 | End: 2023-07-20

## 2023-07-20 RX ORDER — FENTANYL CITRATE 50 UG/ML
25 INJECTION, SOLUTION INTRAMUSCULAR; INTRAVENOUS
Status: DISCONTINUED | OUTPATIENT
Start: 2023-07-20 | End: 2023-07-21 | Stop reason: HOSPADM

## 2023-07-20 RX ORDER — FENTANYL CITRATE 50 UG/ML
INJECTION, SOLUTION INTRAMUSCULAR; INTRAVENOUS PRN
Status: DISCONTINUED | OUTPATIENT
Start: 2023-07-20 | End: 2023-07-20

## 2023-07-20 RX ORDER — DEXMEDETOMIDINE HYDROCHLORIDE 4 UG/ML
INJECTION, SOLUTION INTRAVENOUS PRN
Status: DISCONTINUED | OUTPATIENT
Start: 2023-07-20 | End: 2023-07-20

## 2023-07-20 RX ORDER — HYDROXYZINE HYDROCHLORIDE 25 MG/1
25 TABLET, FILM COATED ORAL EVERY 6 HOURS PRN
Status: DISCONTINUED | OUTPATIENT
Start: 2023-07-20 | End: 2023-07-21 | Stop reason: HOSPADM

## 2023-07-20 RX ORDER — FENTANYL CITRATE 50 UG/ML
25 INJECTION, SOLUTION INTRAMUSCULAR; INTRAVENOUS EVERY 5 MIN PRN
Status: DISCONTINUED | OUTPATIENT
Start: 2023-07-20 | End: 2023-07-21 | Stop reason: HOSPADM

## 2023-07-20 RX ADMIN — LIDOCAINE HYDROCHLORIDE 100 MG: 20 INJECTION, SOLUTION INFILTRATION; PERINEURAL at 07:16

## 2023-07-20 RX ADMIN — FENTANYL CITRATE 50 MCG: 50 INJECTION, SOLUTION INTRAMUSCULAR; INTRAVENOUS at 08:20

## 2023-07-20 RX ADMIN — FENTANYL CITRATE 25 MCG: 50 INJECTION, SOLUTION INTRAMUSCULAR; INTRAVENOUS at 08:33

## 2023-07-20 RX ADMIN — FENTANYL CITRATE 25 MCG: 50 INJECTION, SOLUTION INTRAMUSCULAR; INTRAVENOUS at 08:42

## 2023-07-20 RX ADMIN — PROPOFOL 200 MCG/KG/MIN: 10 INJECTION, EMULSION INTRAVENOUS at 07:16

## 2023-07-20 RX ADMIN — DEXMEDETOMIDINE HYDROCHLORIDE 8 MCG: 4 INJECTION, SOLUTION INTRAVENOUS at 09:01

## 2023-07-20 RX ADMIN — DEXMEDETOMIDINE HYDROCHLORIDE 12 MCG: 4 INJECTION, SOLUTION INTRAVENOUS at 08:02

## 2023-07-20 RX ADMIN — SODIUM CHLORIDE, SODIUM LACTATE, POTASSIUM CHLORIDE, CALCIUM CHLORIDE: 600; 310; 30; 20 INJECTION, SOLUTION INTRAVENOUS at 06:36

## 2023-07-20 RX ADMIN — DEXMEDETOMIDINE HYDROCHLORIDE 4 MCG: 4 INJECTION, SOLUTION INTRAVENOUS at 08:27

## 2023-07-20 RX ADMIN — PROPOFOL 250 MG: 10 INJECTION, EMULSION INTRAVENOUS at 07:16

## 2023-07-20 RX ADMIN — FENTANYL CITRATE 50 MCG: 50 INJECTION, SOLUTION INTRAMUSCULAR; INTRAVENOUS at 07:16

## 2023-07-20 RX ADMIN — FENTANYL CITRATE 50 MCG: 50 INJECTION, SOLUTION INTRAMUSCULAR; INTRAVENOUS at 07:24

## 2023-07-20 RX ADMIN — DEXAMETHASONE SODIUM PHOSPHATE 10 MG: 4 INJECTION, SOLUTION INTRA-ARTICULAR; INTRALESIONAL; INTRAMUSCULAR; INTRAVENOUS; SOFT TISSUE at 07:23

## 2023-07-20 RX ADMIN — ACETAMINOPHEN 975 MG: 325 TABLET ORAL at 06:41

## 2023-07-20 RX ADMIN — ONDANSETRON 4 MG: 2 INJECTION INTRAMUSCULAR; INTRAVENOUS at 08:32

## 2023-07-20 RX ADMIN — SODIUM CHLORIDE, SODIUM LACTATE, POTASSIUM CHLORIDE, CALCIUM CHLORIDE: 600; 310; 30; 20 INJECTION, SOLUTION INTRAVENOUS at 06:40

## 2023-07-20 RX ADMIN — CEFAZOLIN SODIUM 2 G: 2 SOLUTION INTRAVENOUS at 07:19

## 2023-07-20 RX ADMIN — GABAPENTIN 300 MG: 300 CAPSULE ORAL at 06:41

## 2023-07-20 NOTE — INTERVAL H&P NOTE
"I have reviewed the surgical (or preoperative) H&P that is linked to this encounter, and examined the patient. There are no significant changes    Clinical Conditions Present on Arrival:  Clinically Significant Risk Factors Present on Admission                  # Overweight: Estimated body mass index is 29.04 kg/m  as calculated from the following:    Height as of this encounter: 1.676 m (5' 6\").    Weight as of this encounter: 81.6 kg (179 lb 14.3 oz).       "

## 2023-07-20 NOTE — ANESTHESIA PREPROCEDURE EVALUATION
Anesthesia Pre-Procedure Evaluation    Patient: Angy Lino   MRN: [id number] : 1990        Procedure : Procedure(s):  BILATERAL FEMINIZING BREAST AUGMENTATION WITH USE OF SMOOTH ROUND SILICONE IMPLANTS          Past Medical History:   Diagnosis Date     Anxiety      Autism      Gender dysphoria       History reviewed. No pertinent surgical history.   No Known Allergies   Social History     Tobacco Use     Smoking status: Never     Smokeless tobacco: Never   Substance Use Topics     Alcohol use: Not Currently      Wt Readings from Last 1 Encounters:   23 81.6 kg (179 lb 14.3 oz)        Anesthesia Evaluation            ROS/MED HX  ENT/Pulmonary:  - neg pulmonary ROS     Neurologic:  - neg neurologic ROS     Cardiovascular:  - neg cardiovascular ROS     METS/Exercise Tolerance: >4 METS    Hematologic:  - neg hematologic  ROS     Musculoskeletal:  - neg musculoskeletal ROS     GI/Hepatic:  - neg GI/hepatic ROS     Renal/Genitourinary:  - neg Renal ROS     Endo:  - neg endo ROS     Psychiatric/Substance Use: Comment: autism - neg psychiatric ROS   (+) psychiatric history anxiety     Infectious Disease:  - neg infectious disease ROS     Malignancy:  - neg malignancy ROS     Other:  - neg other ROS          Physical Exam    Airway  airway exam normal      Mallampati: II   TM distance: > 3 FB   Neck ROM: full   Mouth opening: > 3 cm    Respiratory Devices and Support         Dental       (+) Completely normal teeth      Cardiovascular          Rhythm and rate: regular and normal     Pulmonary   pulmonary exam normal        breath sounds clear to auscultation           OUTSIDE LABS:  CBC: No results found for: WBC, HGB, HCT, PLT  BMP: No results found for: NA, POTASSIUM, CHLORIDE, CO2, BUN, CR, GLC  COAGS: No results found for: PTT, INR, FIBR  POC: No results found for: BGM, HCG, HCGS  HEPATIC: No results found for: ALBUMIN, PROTTOTAL, ALT, AST, GGT, ALKPHOS, BILITOTAL, BILIDIRECT, HOLLAND  OTHER: No  results found for: PH, LACT, A1C, JOAQUINA, PHOS, MAG, LIPASE, AMYLASE, TSH, T4, T3, CRP, SED    Anesthesia Plan    ASA Status:  1   NPO Status:  NPO Appropriate    Anesthesia Type: General.     - Airway: LMA   Induction: Intravenous.   Maintenance: Balanced.        Consents    Anesthesia Plan(s) and associated risks, benefits, and realistic alternatives discussed. Questions answered and patient/representative(s) expressed understanding.    - Discussed:     - Discussed with:  Patient      - Extended Intubation/Ventilatory Support Discussed: No.      - Patient is DNR/DNI Status: No    Use of blood products discussed: No .     Postoperative Care    Pain management: IV analgesics, Oral pain medications, Multi-modal analgesia.   PONV prophylaxis: Ondansetron (or other 5HT-3), Background Propofol Infusion     Comments:                Ty Wayne MD

## 2023-07-20 NOTE — PROGRESS NOTES
PRS    No changes in history or exam. Medically optimized.     OR today for BILATERAL feminizing breast augmentation with use of smooth round silicone gel implants    Discussed the risks of surgery, including but not limited to: Infection, bleeding, pain, poor scarring, hematoma, asymmetry, suboptimal aesthetic result, need for revision surgery, capsular contracture, implant rupture, implant malposition, double bubble deformity, animation deformity, CHARLES-ALCL, implant related illness.  Despite these risks, patient consents and would like to proceed with bilateral feminizing breast augmentation in the partial submuscular or dual plane position using silicone smooth round gel implants.  All questions answered.    Cam Real MD, PhD

## 2023-07-20 NOTE — BRIEF OP NOTE
[facility]    Brief Operative Note    Pre-operative diagnosis: Gender dysphoria in adult [F64.0]  Post-operative diagnosis Same as pre-operative diagnosis    Procedure: Procedure(s):  BILATERAL FEMINIZING BREAST AUGMENTATION WITH USE OF SMOOTH ROUND SILICONE IMPLANTS  Surgeon: Surgeon(s) and Role:     * Cam Real MD - Primary     * Jimenez Browning MD - Resident - Assisting  Anesthesia: General   Estimated Blood Loss: 10 ml    Drains: None  Specimens: * No specimens in log *  Findings:   415cc implant placed in the right breast (\[facility]\"" [id number]) and 385cc implant placed in the left breast (\[facility]\"" [id number]). .  Complications: None.  Implants:   Implant Name Type Inv. Item Serial No.  Lot No. LRB No. Used Action   Natrelle Inspira SoftTouch Breast Implant   [id number]   Right 1 Implanted   Nat[employer]le Inspira SoftTouch Breast Implant   [id number]   Left 1 Implanted

## 2023-07-20 NOTE — OP NOTE
PLASTIC SURGERY OPERATIVE REPORT     Date of Surgery: 7/20/2023  Surgical Service: Plastic Surgery     Preoperative Diagnosis: Chest gender dysphoria     Postoperative Diagnosis: Same as preoperative diagnosis     Procedures Performed: Bilateral feminizing breast augmentation with use of permanent silicone gel implants     Attending:  GAMALIEL Real MD PhD  Assistant: ROBERTA Browning MD     Complications: None apparent  Specimens: None  Implants: Allergan 415 SSF on the right (SN [id number]) and 385 SSF on the left (SN [id number])  Estimated blood loss: 25 mL  Wound classification: Clean  Anesthesia: General     Indications for Procedure: This is a 32-year-old transfemale presenting with chest gender dysphoria.  Has a strong relationship that is long-term with her therapist with excellent support at home, and after providing a letter of support, seeks chest feminizing surgery with permanent silicone gel implant augmentation.     Intraoperative findings: Bilateral partial submuscular dual plane 2 pockets were created with no bleeding, leaving a cuff of inferior pectoralis major muscle caudally.  Very good symmetry following trial of full profile implants. Inframammary fold was lowered to approximately 10 cm on stretch bilaterally.     Description of Procedure: Patient was seen in the preoperative holding area.  Consent was verified.  The breasts were marked.  All additional questions were answered.  The risks of the surgery were reiterated, including (but not limited to): infection, bleeding, pain, poor scarring, need for aesthetic revision, implant malposition, implant rupture, animation deformity, capsular contracture, implant extrusion, hematoma, seroma. Following discussion of all these risks, the patient agrees to proceed with surgery.  Patient was then transferred to the operating room and placed supine on the operating table.  All pressure points were padded.  Sequential compression devices were placed on bilateral lower  extremities and verified to be operational.  IV antibiotic prophylaxis was given.  Preinduction timeout was performed.  General anesthesia was induced.  At the start of the case, the chest and both breasts were prepped and draped in usual sterile fashion.  The nipples were covered with sterile Tegaderm.  Of note, the same procedure was done on both breasts. Next, a 5 cm inframammary incision was done at approximately 10 cm of nipple inframammary distance on stretch with the medialmost extent of the incision located at the medial edge of the areola.  Next, once through skin, monopolar electrocautery was used to identify the pectoralis major muscle and its lateral border.  Once done, pectoralis major was lifted off of the chest wall.   Next, the inferior pectoralis major was  from the overlying glandular tissue in a dual plane to fashion to better reposition the breast mound over the eventual implant.  Next, the inferior pectoralis major tendons were monopolar electrocauterized with approximately 1 cm of muscle cuff left attached to the chest wall caudally.  The pectoralis major inferior attachments were divided all the way to the medial origins.  Next, hemostasis was ensured, although there was little to no bleeding.  Next, 415 cc full profile implant sizers were placed bilaterally.  It was noted that the left breast pocket had a slightly more prominent 4th or 5th rib, so a smaler 385 cc full profile sizer was placed on the left and found to be better in terms of symmetry. Provisional SFS Vicryl suture as well as skin stapling was done.  Patient was sat upright for evaluation.  It was determined that the implant position was appropriate and symmetric, and the size of the augmentation was body appropriate.  Patient was then laid back down.  The sizers were removed. The permanent silicone gel implant placement was first done on the left side with complete pocket closure, followed by the right side. The same  procedure was done on both sides. Next, each breast pocket was irrigated with 250 cc of antibiotic-containing sterile normal saline solution.  Hemostasis was again ensured using monopolar electrocautery.  There was no bleeding bilaterally.  Next, Betadine solution was irrigated into both breast pockets.  A total of 100 cc of the solution was used on each side.  Next, sterile gloves were exchanged for new ones.  Additional sterile blue towels were used to redraped around the breasts.  Only one permanent gel implant at a time was opened.  The implant was coated with antibiotic solution.  A Montelongo funnel was opened.  A Shena retractor was cleansed with antibiotic solution and positioned within the breast pocket.  The implant was then placed within the Montelongo funnel and delivered into the breast pocket, after ensuring the appropriate implant as well as positioning.  Next, three times three-point 0 PDS suture was placed from the chest wall to the superficial fascial system of both the inferior and superior breast skin flaps, to reestablish to the new inframammary fold.  Additionally, 2-0 Vicryl suture was used to reapproximate the SFS and to close the pocket.  The same procedure was done on the contralateral side.  The skin was closed with 3-0 deep dermal and a running 3-0 intracuticular suture.  The incisions were dressed with Steri-Strips and skin adhesive.  Fluff dressing and foam tape were used to secure the implants in their current position on the chest wall.  Patient was then extubated uneventfully, and transferred to the postanesthesia care unit without event.     Postoperative plan:  Patient will be seen in approximately 2 weeks for reevaluation.  Patient was instructed to leave the foam tape in place for 36 to 48 hours.  After which, patient will be able to shower.  Once showered, patient was instructed to keep the incision clean and to allow the Steri-Strips to fall off on their own.  Additionally, patient  was instructed to engage in no strenuous activity that would raise her heart rate above 100 bpm or lifting involving anything heavier than 3 to 5 pounds.     Cam Real MD, PhD

## 2023-07-20 NOTE — DISCHARGE INSTRUCTIONS
Plastic Surgery Discharge Instructions    Patient has been treated for top gender dysphoria with Dr. Real on 7/20/2023.     Care: Please wear the supportive surgical bra at all times. If you have foam tape strapped on your upper chest, please leave in place for at least 36-48 hours. You can shower after this time frame, but keep the incision dressing as dry as possible. Do not rub the incisions. When you are done showering, gently pad dry and wear the supportive surgical bra at all times, and particularly when ambulating upright. You can loosen supportive bra when lying flat and resting in bed.     Medications: You can take Ibuprofen 400-800 mg and Tylenol 650 mg for pain relief. Please take each every 6 hours, and for optimal pain relief - please stagger the medications so that you are taking one or the other every 3 hours. If you have been prescribed additional pain medications or muscle relaxants, please take as instructed and as needed. If you are taking additional pain medications, please do not exceed 4000 mg of Tylenol daily from all sources. Also, if you are taking narcotic medications, please do not operate heavy machinery or drive. If you have been prescribed antibiotics, please also take as instructed and for the first few days after surgery.     Diet: Start with clear liquids and slow down if nauseated. Advance the diet as tolerated.    Weight-bearing: You can use your arms. No heavy lifting. No strenuous activities. For 2 weeks, do not elevate your heart rate above 100 beats per minute and no lifting greater than 3-5 pounds. After your first clinic visit, we will discuss advancing your activity level to include cardio workout and more lifting.     ER Instructions: Please call the office and/or consider return to the ER if you experience worsening pain not relieved by medications, increased swelling, redness or high fevers >101F or if there are unexpected problems like shortness of  breath.    Post-op follow-up: Clinic in 10-14 days with Dr. Real at the [facility]    Cam Real MD, PhD     [facility]  Home Care Following Anesthesia  For 24 hours after surgery:  Get plenty of rest.  A responsible adult must stay with you for at least 24 hours after you leave the surgery center.  Do not drive or use heavy equipment.  If you have weakness or tingling, don't drive or use heavy equipment until this feeling goes away.   Do not drink alcohol.   Avoid strenuous or risky activities.  Ask for help when climbing stairs.  You may feel lightheaded.  IF so, sit for a few minutes before standing.  Have someone help you get up.   If you have nausea (feel sick to your stomach): Drink only clear liquids such as apple juice, ginger ale, broth or 7-Up.  Rest may also help.  Be sure to drink enough fluids.  Move to a regular diet as you feel able.   You may have a slight fever.  Call the doctor if your fever is over 100 F (37.7 C) (taken under the tongue) or lasts longer than 24 hours.  You may have a dry mouth, a sore throat, muscle aches or trouble sleeping. These should go away after 24 hours.  Do not make important or legal decisions.   It is recommended to avoid smoking.               Tips for taking pain medications  To get the best pain relief possible, remember these points:  Take pain medications as directed, before pain becomes severe.  Pain medication can upset your stomach: taking it with food may help.  Constipation is a common side effect of pain medication. Drink plenty of  fluids.  Eat foods high in fiber. Take a stool softener if recommended by your doctor or pharmacist.  Do not drink alcohol, drive or operate machinery while taking pain medications.  Ask about other ways to control pain, such as with heat, ice or relaxation.    Tylenol/Acetaminophen Consumption    If you feel your pain relief is insufficient, you may take Tylenol/Acetaminophen  in addition to your narcotic pain medication.   Be careful not to exceed 4,000 mg of Tylenol/Acetaminophen in a 24 hour period from all sources.  If you are taking extra strength Tylenol/acetaminophen (500 mg), the maximum dose is 8 tablets in 24 hours.  If you are taking regular strength acetaminophen (325 mg), the maximum dose is 12 tablets in 24 hours.  You received 975 mg of tylenol at 6:45 AM, next dose available at 12:45 PM- then just follow the package instructions    Call a doctor for any of the following:  Signs of infection (fever, growing tenderness at the surgery site, a large amount of drainage or bleeding, severe pain, foul-smelling drainage, redness, swelling).  It has been over 8 to 10 hours since surgery and you are still not able to urinate (pass water).  Headache for over 24 hours.  Signs of Covid-19 infection (temperature over 100 degrees, shortness of breath, cough, loss of taste/smell, generalized body aches, persistent headache, chills, sore throat, nausea/vomiting/diarrhea)  Your doctor is:       Dr. Cam Real, Plastic Surgery: [phone]               Or dial [phone] and ask for the resident on call for:  Plastics  For emergency care, call the:  [facility]:  [phone] (TTY for hearing impaired: [phone])

## 2023-07-20 NOTE — ANESTHESIA CARE TRANSFER NOTE
Patient: Angy Lino    Procedure: Procedure(s):  BILATERAL FEMINIZING BREAST AUGMENTATION WITH USE OF SMOOTH ROUND SILICONE IMPLANTS       Diagnosis: Gender dysphoria in adult [F64.0]  Diagnosis Additional Information: No value filed.    Anesthesia Type:   No value filed.     Note:    Oropharynx: oropharynx clear of all foreign objects and spontaneously breathing  Level of Consciousness: awake and drowsy    Level of Supplemental Oxygen (L/min / FiO2): 8  Independent Airway: airway patency satisfactory and stable  Dentition: dentition unchanged  Vital Signs Stable: post-procedure vital signs reviewed and stable  Report to RN Given: handoff report given            Vitals:  Vitals Value Taken Time   /87 07/20/23 0915   Temp 36  C (96.8  F) 07/20/23 0915   Pulse 77 07/20/23 0919   Resp 15 07/20/23 0919   SpO2 98 % 07/20/23 0919   Vitals shown include unvalidated device data.    Electronically Signed By: NIDA Noble CRNA  July 20, 2023  9:20 AM

## 2023-07-20 NOTE — ANESTHESIA POSTPROCEDURE EVALUATION
Patient: Angy Lino    Procedure: Procedure(s):  BILATERAL FEMINIZING BREAST AUGMENTATION WITH USE OF SMOOTH ROUND SILICONE IMPLANTS       Anesthesia Type:  No value filed.    Note:  Disposition: Outpatient   Postop Pain Control: Uneventful            Sign Out: Well controlled pain   PONV: No   Neuro/Psych: Uneventful            Sign Out: Acceptable/Baseline neuro status   Airway/Respiratory: Uneventful            Sign Out: Acceptable/Baseline resp. status   CV/Hemodynamics: Uneventful            Sign Out: Acceptable CV status   Other NRE: NONE   DID A NON-ROUTINE EVENT OCCUR? No           Last vitals:  Vitals Value Taken Time   /77 07/20/23 0945   Temp 36.5  C (97.7  F) 07/20/23 0945   Pulse 80 07/20/23 0946   Resp 11 07/20/23 0946   SpO2 99 % 07/20/23 0946   Vitals shown include unvalidated device data.    Electronically Signed By: Ty Wayne MD  July 20, 2023  12:32 PM

## 2023-07-29 NOTE — PROGRESS NOTES
"Plastic and Reconstructive Surgery Note  7/29/2023  Attending: Dr. Real    S: Doing well. No issues.     O:  /79 (BP Location: Left arm, Patient Position: Sitting, Cuff Size: Adult Regular)   Pulse 95   Ht 1.676 m (5' 6\")   Wt 81.5 kg (179 lb 11.2 oz)   LMP  (LMP Unknown)   SpO2 94%   BMI 29.00 kg/m    Gen: well appearing, NAD  Chest: incisions healing well. No redness, warmth, swelling or tenderness. Bilateral nipples viable. Implants well aligned    Assessment: 32 year old transgender female s/p bilateral feminizing breast augmentation     Plan:   Continue with restrictions, no lifting more than 10lbs  Surgical bra at all times, okay to remove for showering and hygiene  No underwire, okay to wear low to medium support compression  Scar care at 3 weeks  No soaking, swimming or submerging for 6 weeks  No heavy lifting for 6 weeks  No pushups/chest exercises for 3 months   "

## 2023-07-31 ENCOUNTER — OFFICE VISIT (OUTPATIENT)
Dept: PLASTIC SURGERY | Facility: CLINIC | Age: 33
End: 2023-07-31
Payer: COMMERCIAL

## 2023-07-31 VITALS
OXYGEN SATURATION: 94 % | HEIGHT: 66 IN | HEART RATE: 95 BPM | DIASTOLIC BLOOD PRESSURE: 79 MMHG | SYSTOLIC BLOOD PRESSURE: 119 MMHG | BODY MASS INDEX: 28.88 KG/M2 | WEIGHT: 179.7 LBS

## 2023-07-31 DIAGNOSIS — F64.0 GENDER DYSPHORIA IN ADULT: Primary | ICD-10-CM

## 2023-07-31 PROCEDURE — 99024 POSTOP FOLLOW-UP VISIT: CPT | Performed by: PHYSICIAN ASSISTANT

## 2023-07-31 ASSESSMENT — PAIN SCALES - GENERAL: PAINLEVEL: MILD PAIN (3)

## 2023-07-31 NOTE — LETTER
"7/31/2023       RE: Angy Lino  [address]  [facility] [address]     Dear Colleague,    Thank you for referring your patient, Angy Lino, to the [facility] PLASTIC AND RECONSTRUCTIVE SURGERY CLINIC [place] [facility]. Please see a copy of my visit note below.    Plastic and Reconstructive Surgery Note  7/29/2023  Attending: Dr. Real    S: Doing well. No issues.     O:  /79 (BP Location: Left arm, Patient Position: Sitting, Cuff Size: Adult Regular)   Pulse 95   Ht 1.676 m (5' 6\")   Wt 81.5 kg (179 lb 11.2 oz)   LMP  (LMP Unknown)   SpO2 94%   BMI 29.00 kg/m    Gen: well appearing, NAD  Chest: incisions healing well. No redness, warmth, swelling or tenderness. Bilateral nipples viable. Implants well aligned    Assessment: 32 year old transgender female s/p bilateral feminizing breast augmentation     Plan:   Continue with restrictions, no lifting more than 10lbs  Surgical bra at all times, okay to remove for showering and hygiene  No underwire, okay to wear low to medium support compression  Scar care at 3 weeks  No soaking, swimming or submerging for 6 weeks  No heavy lifting for 6 weeks  No pushups/chest exercises for 3 months               Again, thank you for allowing me to participate in the care of your patient.      Sincerely,    Kendra Coley PA-C    "

## 2023-07-31 NOTE — NURSING NOTE
"Chief Complaint   Patient presents with    Surgical Followup     Angy is being seen today for a 2 week breast augmentation post-op, DOS 7/20/23.       Vitals:    07/31/23 1422   BP: 119/79   BP Location: Left arm   Patient Position: Sitting   Cuff Size: Adult Regular   Pulse: 95   SpO2: 94%   Weight: 179 lb 11.2 oz   Height: 5' 6\"       Body mass index is 29 kg/m .    Jeanmarie Diaz, EMT    "

## 2023-08-15 ENCOUNTER — OFFICE VISIT (OUTPATIENT)
Dept: PLASTIC SURGERY | Facility: CLINIC | Age: 33
End: 2023-08-15
Payer: COMMERCIAL

## 2023-08-15 ENCOUNTER — PRE VISIT (OUTPATIENT)
Dept: PLASTIC SURGERY | Facility: CLINIC | Age: 33
End: 2023-08-15

## 2023-08-15 VITALS
HEART RATE: 92 BPM | BODY MASS INDEX: 28.78 KG/M2 | SYSTOLIC BLOOD PRESSURE: 113 MMHG | HEIGHT: 66 IN | OXYGEN SATURATION: 95 % | WEIGHT: 179.1 LBS | DIASTOLIC BLOOD PRESSURE: 75 MMHG

## 2023-08-15 DIAGNOSIS — F64.0 GENDER DYSPHORIA IN ADULT: Primary | ICD-10-CM

## 2023-08-15 PROCEDURE — 99205 OFFICE O/P NEW HI 60 MIN: CPT | Performed by: UROLOGY

## 2023-08-15 ASSESSMENT — PAIN SCALES - GENERAL: PAINLEVEL: NO PAIN (1)

## 2023-08-15 NOTE — PROGRESS NOTES
Name: Angy Lino     MRN: [id number]   YOB: 1990                 Chief Complaint:   Gender Dysphoria            History of Present Illness:   Angy is a 32 year old transgender female seen in consultation for gender dysphoria.  She is here today with her dad, Nathan.    Patient transitioned starting in 2017  Preferred pronouns are: she/her/hers  The patient has been on exogenous hormones since: 2017.  In terms of an intimate relationship, the patient has a non-binary partner with a penis. Mom and Dad are very supportive of Angy. She signed ELADIO today to allow us to communicate with her mom, dad, and brother. She lives alone with her cat, but mom and dad are only 10 minutes away.  In terms of fertility, the patient: not interested in biologic children    (New)  The patient has not yet obtained a letter of support from therapist for bottom surgery, but has therapist who will write this.     (Prior Surgery)  The patient has previously undergone breast augmentation. This was performed July 2023 by Dr Real.    Long-term surgical goals for the patient include: getting rid of her penis and scrotum/testicles    The patient is here today expressing interest in vaginoplasty. Leaning strongly toward minimal depth. She does not think she would tolerate hair removal on genitals, and has worries about keeping up dilation. She is going to discuss options and get back to us with a final answer.    The patient has not begun hair removal.         Past Medical History:     Past Medical History:   Diagnosis Date    Anxiety     Autism     Gender dysphoria             Past Surgical History:     Past Surgical History:   Procedure Laterality Date    MAMMOPLASTY AUGMENTATION Bilateral 7/20/2023    Procedure: BILATERAL FEMINIZING BREAST AUGMENTATION WITH USE OF SMOOTH ROUND SILICONE IMPLANTS;  Surgeon: Cam Real MD;  Location: [facility] OR            Social History:     Social History     Tobacco Use     "Smoking status: Never    Smokeless tobacco: Never   Substance Use Topics    Alcohol use: Not Currently            Family History:     Family History   Problem Relation Age of Onset    Anesthesia Reaction No family hx of     Venous thrombosis No family hx of               Allergies:   No Known Allergies         Medications:     Current Outpatient Medications   Medication Sig    acetaminophen (TYLENOL) 325 MG tablet Take 325-650 mg by mouth every 6 hours as needed for mild pain    B-D DISP NEEDLE 25G X 1\" MISC USE FOR WEEKLY INJECTION    B-D SYRINGE LUER-KAYLEIGH 1 ML MISC USE FOR WEEKLY ADMINISTRATION    BD DISP NEEDLES 18G X 1-1/2\" MISC USE ONCE WEEKLY    cephALEXin (KEFLEX) 500 MG capsule Take 1 capsule (500 mg) by mouth 4 times daily    estradiol valerate (DELESTROGEN) 20 MG/ML injection Inject 6 mg into the muscle once a week Mondays or Thursdays    FLUoxetine (PROZAC) 20 MG capsule Take 1 capsule by mouth every morning    methocarbamol (ROBAXIN) 500 MG tablet Take 1 tablet (500 mg) by mouth 4 times daily as needed for muscle spasms    ondansetron (ZOFRAN) 4 MG tablet Take 1 tablet (4 mg) by mouth every 6 hours as needed for nausea    progesterone (PROMETRIUM) 200 MG capsule Take 1 capsule by mouth 2 times daily    spironolactone (ALDACTONE) 50 MG tablet Take 50 mg by mouth 2 times daily     No current facility-administered medications for this visit.             Review of Systems:    ROS: ROS neg other than the symptoms noted above in the HPI.          Physical Exam:   /75 (BP Location: Left arm, Patient Position: Sitting, Cuff Size: Adult Regular)   Pulse 92   Ht 1.676 m (5' 6\")   Wt 81.2 kg (179 lb 1.6 oz)   LMP  (LMP Unknown)   SpO2 95%   BMI 28.91 kg/m    General: age-appropriate in NAD  HEENT: Head AT/NC, EOMI, CN Grossly intact  Resp: no respiratory distress  : circumcised penis without lesions or drainage. Bilateral testicles present in scrotum.  Neuro: grossly intact  Motor: excellent strength " throughout  Skin: clear of rashes or ecchymoses.          Outside records:    I spent 10 minutes reviewing outside records.         Assessment and Plan:   32 year old transgender female with gender dysphoria    The criteria for genital surgery are specific to the type of surgery being requested.  Criteria for bottom surgery:    1. Persistent, well documented gender dysphoria;  2. Capacity to make a fully informed decision and to consent for treatment;  3. Age of consent (>18 years old)  4. If significant medical or mental health concerns are present, they must be well controlled.  5. 12 continuous months of hormone therapy as appropriate to the patient s gender goals (unless  the patient has a medical contraindication or is otherwise unable or unwilling to take  Hormones).  6. Two letters of support    The aim of hormone therapy prior to gonadectomy is primarily to introduce a period of reversible  estrogen or testosterone suppression, before the patient undergoes irreversible surgical intervention.    I reviewed the steps of orchiectomy. I reviewed the surgical procedure. I reviewed the risks and benefits including bleeding, infection and irreversible nature of the procedure. The patient would like orchiectomy as part of vaginoplasty.    Hair removal is a requirement prior to full depth vaginoplasty as the genital skin will be placed in the vaginal cavity. Lack of hair removal would lead to complications related to intravaginal hair. This is nearly impossible to remove postoperatively.    She has a persistent, well documented gender dysphoria. She has capacity to make a fully informed decision and to consent for treatment. Her mental health issues are well controlled. She has been on continuous hormones for years. She will obtain a letter of support.     The patient meets all of these criteria. We discussed that gender affirmation surgery should be considered permanent. We discussed risks/complications of rectal  injury, rectovaginal fistula, bleeding, fluid collection, infection, injury to surrounding structures, flap loss, sensory loss, wound dehiscence, vaginal prolapse, vaginal shrinkage/stenosis, need for lifelong dilation, urinary stream abnormalities, DVT/PE and need for revision surgery.     We discussed the option for minimal depth and full depth. She is strongly considering minimal depth vaginoplasty, but wants to take some time to think about options before making final decision. She will get LOS and contact us with final decision. Goal would be to wait for surgery after new year.     We also discussed the need to stop hormones carolina-procedurally for 1 week before and after surgery.     We discussed that transgender vaginoplasty for this patient would include: penectomy, orchiectomy, clitoroplasty, labiaplasty, urethral reconstruction, creation of a minimal depth vagina, and scrotectomy.     Does not need hair removal for minimal depth vaginoplasty  Would be ready for prior auth once letter of support received for minimal epth vaginoplasty      Plan: Patient will obtain LOS and contact us once she makes final decision about minimal depth vaginoplasty. She would then be ready for PA. Prefers surgery scheduled after jan 1, 2024.    F/U: Patient will contact [facility] team once she makes final decision.     Physician Attestation   I, Ever Alexander MD, saw this patient and agree with the findings and plan of care as documented in the note.      Ever Alexander MD  Reconstructive Urology    60 minutes spent by me on the date of the encounter doing chart review, history and exam, documentation and further activities per the note

## 2023-08-15 NOTE — NURSING NOTE
"Chief Complaint   Patient presents with    Consult     Angy is being seen today for a consult regarding vaginoplasty.       Vitals:    08/15/23 1200   BP: 113/75   BP Location: Left arm   Patient Position: Sitting   Cuff Size: Adult Regular   Pulse: 92   SpO2: 95%   Weight: 81.2 kg (179 lb 1.6 oz)   Height: 1.676 m (5' 6\")       Body mass index is 28.91 kg/m .    Jeanmarie Diaz, EMT    "

## 2023-08-15 NOTE — LETTER
8/15/2023       RE: Angy Lino  [address] [place] [id number]       Dear Colleague,    Thank you for referring your patient, Angy Lino, to the [facility] PLASTIC AND RECONSTRUCTIVE SURGERY CLINIC [place] at [facility]. Please see a copy of my visit note below.        Name: Angy Lino     MRN: [id number]   YOB: 1990                 Chief Complaint:   Gender Dysphoria            History of Present Illness:   Angy is a 32 year old transgender female seen in consultation for gender dysphoria.  She is here today with her dad, Nathan.    Patient transitioned starting in 2017  Preferred pronouns are: she/her/hers  The patient has been on exogenous hormones since: 2017.  In terms of an intimate relationship, the patient has a non-binary partner with a penis. Mom and Dad are very supportive of Angy. She signed ELADIO today to allow us to communicate with her mom, dad, and brother. She lives alone with her cat, but mom and dad are only 10 minutes away.  In terms of fertility, the patient: not interested in biologic children    (New)  The patient has not yet obtained a letter of support from therapist for bottom surgery, but has therapist who will write this.     (Prior Surgery)  The patient has previously undergone breast augmentation. This was performed July 2023 by Dr Real.    Long-term surgical goals for the patient include: getting rid of her penis and scrotum/testicles    The patient is here today expressing interest in vaginoplasty. Leaning strongly toward minimal depth. She does not think she would tolerate hair removal on genitals, and has worries about keeping up dilation. She is going to discuss options and get back to us with a final answer.    The patient has not begun hair removal.         Past Medical History:     Past Medical History:   Diagnosis Date    Anxiety     Autism     Gender dysphoria           "   Past Surgical History:     Past Surgical History:   Procedure Laterality Date    MAMMOPLASTY AUGMENTATION Bilateral 7/20/2023    Procedure: BILATERAL FEMINIZING BREAST AUGMENTATION WITH USE OF SMOOTH ROUND SILICONE IMPLANTS;  Surgeon: Cam Real MD;  Location: [facility] [place]            Social History:     Social History     Tobacco Use    Smoking status: Never    Smokeless tobacco: Never   Substance Use Topics    Alcohol use: Not Currently            Family History:     Family History   Problem Relation Age of Onset    Anesthesia Reaction No family hx of     Venous thrombosis No family hx of               Allergies:   No Known Allergies         Medications:     Current Outpatient Medications   Medication Sig    acetaminophen (TYLENOL) 325 MG tablet Take 325-650 mg by mouth every 6 hours as needed for mild pain    B-D DISP NEEDLE 25G X 1\" MISC USE FOR WEEKLY INJECTION    B-D SYRINGE LUER-KAYLEIGH 1 ML MISC USE FOR WEEKLY ADMINISTRATION    BD DISP NEEDLES 18G X 1-1/2\" MISC USE ONCE WEEKLY    cephALEXin (KEFLEX) 500 MG capsule Take 1 capsule (500 mg) by mouth 4 times daily    estradiol valerate (DELESTROGEN) 20 MG/ML injection Inject 6 mg into the muscle once a week Mondays or Thursdays    FLUoxetine (PROZAC) 20 MG capsule Take 1 capsule by mouth every morning    methocarbamol (ROBAXIN) 500 MG tablet Take 1 tablet (500 mg) by mouth 4 times daily as needed for muscle spasms    ondansetron (ZOFRAN) 4 MG tablet Take 1 tablet (4 mg) by mouth every 6 hours as needed for nausea    progesterone (PROMETRIUM) 200 MG capsule Take 1 capsule by mouth 2 times daily    spironolactone (ALDACTONE) 50 MG tablet Take 50 mg by mouth 2 times daily     No current facility-administered medications for this visit.             Review of Systems:    ROS: ROS neg other than the symptoms noted above in the HPI.          Physical Exam:   /75 (BP Location: Left arm, Patient Position: Sitting, Cuff Size: Adult Regular)   Pulse 92   Ht " "1.676 m (5' 6\")   Wt 81.2 kg (179 lb 1.6 oz)   LMP  (LMP Unknown)   SpO2 95%   BMI 28.91 kg/m    General: age-appropriate in NAD  HEENT: Head AT/NC, EOMI, CN Grossly intact  Resp: no respiratory distress  : circumcised penis without lesions or drainage. Bilateral testicles present in scrotum.  Neuro: grossly intact  Motor: excellent strength throughout  Skin: clear of rashes or ecchymoses.          Outside records:    I spent 10 minutes reviewing outside records.         Assessment and Plan:   32 year old transgender female with gender dysphoria    The criteria for genital surgery are specific to the type of surgery being requested.  Criteria for bottom surgery:    1. Persistent, well documented gender dysphoria;  2. Capacity to make a fully informed decision and to consent for treatment;  3. Age of consent (>18 years old)  4. If significant medical or mental health concerns are present, they must be well controlled.  5. 12 continuous months of hormone therapy as appropriate to the patient s gender goals (unless  the patient has a medical contraindication or is otherwise unable or unwilling to take  Hormones).  6. Two letters of support    The aim of hormone therapy prior to gonadectomy is primarily to introduce a period of reversible  estrogen or testosterone suppression, before the patient undergoes irreversible surgical intervention.    I reviewed the steps of orchiectomy. I reviewed the surgical procedure. I reviewed the risks and benefits including bleeding, infection and irreversible nature of the procedure. The patient would like orchiectomy as part of vaginoplasty.    Hair removal is a requirement prior to full depth vaginoplasty as the genital skin will be placed in the vaginal cavity. Lack of hair removal would lead to complications related to intravaginal hair. This is nearly impossible to remove postoperatively.    She has a persistent, well documented gender dysphoria. She has capacity to make a " fully informed decision and to consent for treatment. Her mental health issues are well controlled. She has been on continuous hormones for years. She will obtain a letter of support.     The patient meets all of these criteria. We discussed that gender affirmation surgery should be considered permanent. We discussed risks/complications of rectal injury, rectovaginal fistula, bleeding, fluid collection, infection, injury to surrounding structures, flap loss, sensory loss, wound dehiscence, vaginal prolapse, vaginal shrinkage/stenosis, need for lifelong dilation, urinary stream abnormalities, DVT/PE and need for revision surgery.     We discussed the option for minimal depth and full depth. She is strongly considering minimal depth vaginoplasty, but wants to take some time to think about options before making final decision. She will get LOS and contact us with final decision. Goal would be to wait for surgery after new year.     We also discussed the need to stop hormones carolina-procedurally for 1 week before and after surgery.     We discussed that transgender vaginoplasty for this patient would include: penectomy, orchiectomy, clitoroplasty, labiaplasty, urethral reconstruction, creation of a minimal depth vagina, and scrotectomy.     Does not need hair removal for minimal depth vaginoplasty  Would be ready for prior auth once letter of support received for minimal epth vaginoplasty      Plan: Patient will obtain LOS and contact us once she makes final decision about minimal depth vaginoplasty. She would then be ready for PA. Prefers surgery scheduled after jan 1, 2024.    F/U: Patient will contact [facility] team once she makes final decision.     Physician Attestation   I, Ever Alexander MD, saw this patient and agree with the findings and plan of care as documented in the note.        60 minutes spent by me on the date of the encounter doing chart review, history and exam, documentation and further activities per the  note                  Again, thank you for allowing me to participate in the care of your patient.      Sincerely,    Ever Alexander MD

## 2024-02-02 ENCOUNTER — PATIENT OUTREACH (OUTPATIENT)
Dept: PLASTIC SURGERY | Facility: CLINIC | Age: 34
End: 2024-02-02
Payer: COMMERCIAL

## 2024-02-02 NOTE — PATIENT INSTRUCTIONS
Left message for pt's father, Nathan today regarding questions about mammogram. Noted during chart review that pt never saw Dr Real for 6 week or 3 month follow up. Requested Nathan call back and discuss. Jefferson WEST RN

## 2024-02-07 ENCOUNTER — DOCUMENTATION ONLY (OUTPATIENT)
Dept: PLASTIC SURGERY | Facility: CLINIC | Age: 34
End: 2024-02-07
Payer: COMMERCIAL

## 2024-02-07 NOTE — PROGRESS NOTES
[facility] :  Care Coordination Note     SITUATION   Angy Lino is a 33 year old adult who is receiving support for:  Care Team  .    BACKGROUND     Pt's LOS from Ian Soriano does not state which procedure Angy seeks. Writer called pt's father Nathan and discussed need with him. He will get updated and send back in. Letter otherwise fits criteria.     ASSESSMENT     Surgery              [facility] Assessment  [facility] ([facility] Enrollment: Enrolled  Patient has a therapist: Yes  Name of therapist: Ian Soriano  Letter of support #1: Received  Letter #1 Date: 12/20/23      PLAN          Nursing Interventions:       Follow-up plan:  1. Update LOS.        Lelo Pack

## 2024-02-14 ENCOUNTER — DOCUMENTATION ONLY (OUTPATIENT)
Dept: PLASTIC SURGERY | Facility: CLINIC | Age: 34
End: 2024-02-14
Payer: COMMERCIAL

## 2024-02-14 NOTE — PROGRESS NOTES
[facility] :  Care Coordination Note     SITUATION   Angy Lino is a 33 year old adult who is receiving support for:  Care Team  .    BACKGROUND     Writer reviewed LOS and it meets criteria. Message sent to Jacob for CR.     ASSESSMENT     Surgery              [facility] Assessment  [facility] ([facility]) Enrollment: (P) Enrolled  Patient has a therapist: (P) Yes  Name of therapist: (P) Ian Soriano  Letter of support #1: (P) Received  Letter #1 Date: (P) 02/02/24      PLAN          Nursing Interventions:       Follow-up plan:  1. CR and submit BRITTNI Pack

## 2024-02-15 DIAGNOSIS — F64.9 GENDER DYSPHORIA: Primary | ICD-10-CM

## 2024-02-15 RX ORDER — CEFAZOLIN SODIUM 2 G/50ML
2 SOLUTION INTRAVENOUS
Status: CANCELLED | OUTPATIENT
Start: 2024-02-15

## 2024-02-15 RX ORDER — HEPARIN SODIUM 5000 [USP'U]/.5ML
5000 INJECTION, SOLUTION INTRAVENOUS; SUBCUTANEOUS
Status: CANCELLED | OUTPATIENT
Start: 2024-02-15

## 2024-02-15 RX ORDER — CEFAZOLIN SODIUM 2 G/50ML
2 SOLUTION INTRAVENOUS SEE ADMIN INSTRUCTIONS
Status: CANCELLED | OUTPATIENT
Start: 2024-02-15

## 2024-03-10 ENCOUNTER — HEALTH MAINTENANCE LETTER (OUTPATIENT)
Age: 34
End: 2024-03-10

## 2024-03-19 ENCOUNTER — TELEPHONE (OUTPATIENT)
Dept: PLASTIC SURGERY | Facility: CLINIC | Age: 34
End: 2024-03-19
Payer: COMMERCIAL

## 2024-03-26 ENCOUNTER — TRANSFERRED RECORDS (OUTPATIENT)
Dept: HEALTH INFORMATION MANAGEMENT | Facility: CLINIC | Age: 34
End: 2024-03-26
Payer: COMMERCIAL

## 2024-04-09 ENCOUNTER — MYC MEDICAL ADVICE (OUTPATIENT)
Dept: PLASTIC SURGERY | Facility: CLINIC | Age: 34
End: 2024-04-09

## 2024-06-06 ENCOUNTER — TELEPHONE (OUTPATIENT)
Dept: UROLOGY | Facility: CLINIC | Age: 34
End: 2024-06-06
Payer: COMMERCIAL

## 2024-06-06 NOTE — TELEPHONE ENCOUNTER
Left voicemail for patient regarding scheduling surgery with Dr. Alexander.  Provided contact number to discuss.  [phone]    Francine Ta, on 6/6/2024 at 12:21 PM       PATIENT:MARLENA JUAN                      MEDICAL RECORD: [id number]
                                               LOCATION:[place] [address]
AGE OF PATIENT: 76                             ADMISSION DATE: 01/13/20
SEX: F   
 
REFERRING PHYSICIAN:                                                             
 
INTERPRETING PHYSICIAN: MADDY PIZARRO MD          

 
 
                         TRANSESOPHAGEAL ECHOCARDIOGRAM
 Date:              01/13/20      RADHA CHARGE Y
                INDICATIONS: AVR                      
 
             PREMEDICATIONS:                               
 
PATIENT'S RESPONSE PROCEDURE                          
 
                     DOPPLER MEASUREMENTS:
            LVIT            LA           PA           RA      
            LVOT          RVOT      Asc. Ao      
AV Gradient Peak       AV Mean      AV Area      
MV Gradient Peak       MV Mean      MV Area      
 INTERPRETATION:                          
 
 
 
 
               Doppler:                          
 
                   2-D:                          
 
     COLOR FLOW DOPPLER                          
 
   NORMAL SALINE STUDY:                     
 
          MISCELLANOUS:                          
 
             DIAGNOSIS:                          
 
                  PLAN:                          
 
           Cardiologist:3          Dr. Lau             
   Cardiac Sonographer: Josefa OGDEN             
              COMMENTS:                                              
                                                                                     
 
 
DATE OF SERVICE:  01/14/2020
 
PROCEDURE:  Intraoperative RADHA.
 
Preprocedure shows normal LV function, normal wall motion, markedly calcified
aortic valve.  Post aortic valve replacement shows normal functioning
prosthesis.  LV function remains normal.
 
TRANSINT:[id number] Voice Confirmation ID: [id number] DOCUMENT ID: [id number]
 
 
 
 
 
 
TRANSESOPHAGEAL ECHOCARDIOGRAM REPORT                    [id number]    MARLENA JUAN    
 
 
Electronically Signed by MADDY PIZARRO on 01/16/20 at 1325
 
 
 
 
 
 
 
 
 
 
 
 
 
 
 
 
 
 
 
 
 
 
 
 
 
 
 
 
 
 
 
 
 
 
 
 
 
 
 
 
 
 
 
 
 
CC:                                                             [id number]
DICTATION DATE: 01/14/20 1249     :     01/14/20 1447      ADM IN  
                                                                              
[facility]                                          
[address] [place] [address]

## 2024-06-07 NOTE — TELEPHONE ENCOUNTER
Received voice mail from Nathan (father) regarding scheduling for patient; requested call back at [phone]. Routing to .    Severino Marx on 6/7/2024 at 10:16 AM

## 2024-06-13 NOTE — TELEPHONE ENCOUNTER
Called patient to schedule surgery with Dr. Alexander    Spoke with: Nathan (estuardo)    Date of Surgery: 8/15    Approximate arrival time given:  Yes    Location of surgery: [facility]     Pre-Op H&P: [facility] - [facility] [place]    Post-Op Appt Date: Needs to 2 week post op with Dr. Alexander. Nothing available on schedule.      Imaging needed:  Yes    Discussed with patient pre-op RN will call 2-3 days prior to surgery with arrival time and instructions:  Yes    Discussed with patient PAC RN will provide arrival time and instructions for surgery at the time of the appointment: [[place] locations only]: Yes      Packet sent out: Yes 06/13/24  via Mail - Standard Parent asked packet be sent to their home. [address] [facility] [address] [address]      Additional Comments:  NA      All patients questions were answered and was instructed to review surgical packet and call back with any questions or concerns.       Francine Ta on 6/13/2024 at 10:32 AM

## 2024-06-13 NOTE — TELEPHONE ENCOUNTER
Left voicemail for patient regarding scheduling surgery with Dr. sullivan.  Provided contact number to discuss.  [phone]    Francine Ta, on 6/13/2024 at 10:06 AM

## 2024-06-20 ENCOUNTER — TELEPHONE (OUTPATIENT)
Dept: PLASTIC SURGERY | Facility: CLINIC | Age: 34
End: 2024-06-20
Payer: COMMERCIAL

## 2024-06-20 NOTE — TELEPHONE ENCOUNTER
Called pt's dad Nathan to set up pre-op teaching appointment with Ines. We have a consent to communicate file in pt's chart that communication should go to pt's dad Nathna.     When writer called, Nathan and Angy were just about to sit down and read through the packet together! Writer scheduled pre-op teaching with Ines Su NP on 7/15 at 1:00 pm. Pt and estuardo Evans will be there for this appointment. Nathan said pt does not yet have her pre-op H&P scheduled yet, but will make sure this is scheduled to take place within 30 days of surgery. To be scheduled at [facility] with Dr. To.

## 2024-07-15 ENCOUNTER — OFFICE VISIT (OUTPATIENT)
Dept: PLASTIC SURGERY | Facility: CLINIC | Age: 34
End: 2024-07-15
Payer: COMMERCIAL

## 2024-07-15 VITALS
WEIGHT: 182 LBS | HEART RATE: 82 BPM | DIASTOLIC BLOOD PRESSURE: 79 MMHG | OXYGEN SATURATION: 97 % | BODY MASS INDEX: 29.25 KG/M2 | HEIGHT: 66 IN | SYSTOLIC BLOOD PRESSURE: 118 MMHG

## 2024-07-15 DIAGNOSIS — F64.0 GENDER DYSPHORIA IN ADULT: Primary | ICD-10-CM

## 2024-07-15 PROCEDURE — 99215 OFFICE O/P EST HI 40 MIN: CPT | Performed by: NURSE PRACTITIONER

## 2024-07-15 ASSESSMENT — PAIN SCALES - GENERAL: PAINLEVEL: NO PAIN (0)

## 2024-07-15 NOTE — PROGRESS NOTES
Pre and Post Op Patient Education                                       Diagnosis: gender dysphoria  Teaching pre and post op for: minimal depth vaginoplasty  Person involved in teaching: Angy and her parents (Nathan and Yaa)    Patient demonstrates an understanding of the following:  - Date of surgery:  08/15/2024  - Surgery time: 12:00pm (pt understands that this time could change)  - [facility]   - Pre-operative history & physical: Aug 6, 2024 with Dr To      - Post-op follow-up:   8/30/24 with Ines Su CNP  9/24/24 with Dr Alexander      Patient verbalizes an understanding of the following:  - The need for a responsible adult  and someone to stay with them for the first 24 hours post-operatively: Angy will be inpatient for 2-3 nights after surgery. She would like her parents to take turns being with her during hospital stay  - Pre-op bowel prep: N/A for minimal depth  - NPO per anesthesia guidelines: yes. reviewed  - Pre-op showering x2 with Hibiclens/chlorhexidine soap: yes  - The need to stop/discontinue all hormones 1 weeks before surgery and may restart upon return home after surgery: yes    Discussed   - Pain management after surgery  - Infection prevention and hand hygiene  - Surgical procedure site care taught  - Signs and symptoms of infection  - Wound care and will be taught at the time of discharge  - Reviewed Yes: Pre and Post Operative Instructions packet in full  - Information about how to contact the hospital, nurse, and clinic if needed    Postoperative Plans:  Angy will stay with her parents during initial recovery.      Surgical instructions given to patient in person.    Total time with patient: 40 minutes    NIDA Jackson, CNP

## 2024-07-15 NOTE — NURSING NOTE
"Chief Complaint   Patient presents with    RECHECK     Pre-op consult, DOS 8/15/24.       Vitals:    07/15/24 1246   BP: 118/79   BP Location: Left arm   Patient Position: Sitting   Cuff Size: Adult Regular   Pulse: 82   SpO2: 97%   Weight: 182 lb   Height: 5' 6\"       Body mass index is 29.38 kg/m .  Jeanmarie Diaz, EMT    "

## 2024-07-15 NOTE — LETTER
7/15/2024       RE: Angy Lino  [address]  [facility] [address]     Dear Colleague,    Thank you for referring your patient, Angy Lino, to the [facility] PLASTIC AND RECONSTRUCTIVE SURGERY CLINIC [place] at [facility]. Please see a copy of my visit note below.    Pre and Post Op Patient Education                                       Diagnosis: gender dysphoria  Teaching pre and post op for: minimal depth vaginoplasty  Person involved in teaching: Angy and her parents (Nathan and Yaa)    Patient demonstrates an understanding of the following:  - Date of surgery:  08/15/2024  - Surgery time: 12:00pm (pt understands that this time could change)  - Location of surgery: [facility]   - Pre-operative history & physical: Aug 6, 2024 with Dr To      - Post-op follow-up:   8/30/24 with Ines Su, CNP  9/24/24 with Dr Alexander      Patient verbalizes an understanding of the following:  - The need for a responsible adult  and someone to stay with them for the first 24 hours post-operatively: Angy will be inpatient for 2-3 nights after surgery. She would like her parents to take turns being with her during hospital stay  - Pre-op bowel prep: N/A for minimal depth  - NPO per anesthesia guidelines: yes. reviewed  - Pre-op showering x2 with Hibiclens/chlorhexidine soap: yes  - The need to stop/discontinue all hormones 1 weeks before surgery and may restart upon return home after surgery: yes    Discussed   - Pain management after surgery  - Infection prevention and hand hygiene  - Surgical procedure site care taught  - Signs and symptoms of infection  - Wound care and will be taught at the time of discharge  - Reviewed Yes: Pre and Post Operative Instructions packet in full  - Information about how to contact the hospital, nurse, and clinic if needed    Postoperative Plans:  Angy will stay with her parents  during initial recovery.      Surgical instructions given to patient in person.    Total time with patient: 40 minutes    NIDA Jackson, CNP

## 2024-08-14 RX ORDER — HYDROCORTISONE 25 MG/G
CREAM TOPICAL 2 TIMES DAILY PRN
COMMUNITY
Start: 2024-07-15

## 2024-08-14 RX ORDER — TRAZODONE HYDROCHLORIDE 50 MG/1
25-50 TABLET, FILM COATED ORAL AT BEDTIME
COMMUNITY
Start: 2023-10-04

## 2024-08-15 ENCOUNTER — ANESTHESIA EVENT (OUTPATIENT)
Dept: SURGERY | Facility: CLINIC | Age: 34
DRG: 876 | End: 2024-08-15
Payer: COMMERCIAL

## 2024-08-15 ENCOUNTER — HOSPITAL ENCOUNTER (INPATIENT)
Facility: CLINIC | Age: 34
LOS: 2 days | Discharge: HOME | DRG: 876 | End: 2024-08-17
Attending: UROLOGY | Admitting: UROLOGY
Payer: COMMERCIAL

## 2024-08-15 ENCOUNTER — ANESTHESIA (OUTPATIENT)
Dept: SURGERY | Facility: CLINIC | Age: 34
DRG: 876 | End: 2024-08-15
Payer: COMMERCIAL

## 2024-08-15 DIAGNOSIS — F64.0 GENDER DYSPHORIA IN ADULT: Primary | ICD-10-CM

## 2024-08-15 PROBLEM — F64.9 GENDER DYSPHORIA: Status: ACTIVE | Noted: 2024-08-15

## 2024-08-15 LAB — GLUCOSE BLDC GLUCOMTR-MCNC: 85 MG/DL (ref 70–99)

## 2024-08-15 PROCEDURE — 250N000009 HC RX 250: Performed by: NURSE ANESTHETIST, CERTIFIED REGISTERED

## 2024-08-15 PROCEDURE — 250N000025 HC SEVOFLURANE, PER MIN: Performed by: UROLOGY

## 2024-08-15 PROCEDURE — 57292 CONSTRUCT VAGINA WITH GRAFT: CPT | Mod: KX | Performed by: UROLOGY

## 2024-08-15 PROCEDURE — 54125 REMOVAL OF PENIS: CPT | Mod: KX | Performed by: UROLOGY

## 2024-08-15 PROCEDURE — 53410 RECONSTRUCTION OF URETHRA: CPT | Mod: KX | Performed by: UROLOGY

## 2024-08-15 PROCEDURE — 999N000141 HC STATISTIC PRE-PROCEDURE NURSING ASSESSMENT: Performed by: UROLOGY

## 2024-08-15 PROCEDURE — 56805 CLITOROPLASTY INTERSEX STATE: CPT | Mod: KX | Performed by: UROLOGY

## 2024-08-15 PROCEDURE — 360N000078 HC SURGERY LEVEL 5, PER MIN: Performed by: UROLOGY

## 2024-08-15 PROCEDURE — 250N000013 HC RX MED GY IP 250 OP 250 PS 637: Performed by: ANESTHESIOLOGY

## 2024-08-15 PROCEDURE — 54520 REMOVAL OF TESTIS: CPT | Mod: 50 | Performed by: UROLOGY

## 2024-08-15 PROCEDURE — 56805 CLITOROPLASTY INTERSEX STATE: CPT

## 2024-08-15 PROCEDURE — 250N000013 HC RX MED GY IP 250 OP 250 PS 637: Performed by: STUDENT IN AN ORGANIZED HEALTH CARE EDUCATION/TRAINING PROGRAM

## 2024-08-15 PROCEDURE — 250N000009 HC RX 250

## 2024-08-15 PROCEDURE — 272N000001 HC OR GENERAL SUPPLY STERILE: Performed by: UROLOGY

## 2024-08-15 PROCEDURE — 120N000002 HC R&B MED SURG/OB UMMC

## 2024-08-15 PROCEDURE — 258N000003 HC RX IP 258 OP 636

## 2024-08-15 PROCEDURE — 88302 TISSUE EXAM BY PATHOLOGIST: CPT | Mod: 26 | Performed by: PATHOLOGY

## 2024-08-15 PROCEDURE — 250N000011 HC RX IP 250 OP 636: Performed by: UROLOGY

## 2024-08-15 PROCEDURE — 250N000011 HC RX IP 250 OP 636

## 2024-08-15 PROCEDURE — 370N000017 HC ANESTHESIA TECHNICAL FEE, PER MIN: Performed by: UROLOGY

## 2024-08-15 PROCEDURE — 14301 TIS TRNFR ANY 30.1-60 SQ CM: CPT | Mod: XS | Performed by: UROLOGY

## 2024-08-15 PROCEDURE — 56805 CLITOROPLASTY INTERSEX STATE: CPT | Performed by: ANESTHESIOLOGY

## 2024-08-15 PROCEDURE — 0W4M070 CREATION OF VAGINA IN MALE PERINEUM WITH AUTOLOGOUS TISSUE SUBSTITUTE, OPEN APPROACH: ICD-10-PCS | Performed by: UROLOGY

## 2024-08-15 PROCEDURE — 55150 REMOVAL OF SCROTUM: CPT | Mod: KX | Performed by: UROLOGY

## 2024-08-15 PROCEDURE — 710N000010 HC RECOVERY PHASE 1, LEVEL 2, PER MIN: Performed by: UROLOGY

## 2024-08-15 PROCEDURE — 88302 TISSUE EXAM BY PATHOLOGIST: CPT | Mod: TC | Performed by: UROLOGY

## 2024-08-15 PROCEDURE — 250N000009 HC RX 250: Performed by: UROLOGY

## 2024-08-15 PROCEDURE — 258N000003 HC RX IP 258 OP 636: Performed by: STUDENT IN AN ORGANIZED HEALTH CARE EDUCATION/TRAINING PROGRAM

## 2024-08-15 PROCEDURE — 250N000011 HC RX IP 250 OP 636: Performed by: ANESTHESIOLOGY

## 2024-08-15 RX ORDER — HEPARIN SODIUM 5000 [USP'U]/.5ML
5000 INJECTION, SOLUTION INTRAVENOUS; SUBCUTANEOUS
Status: COMPLETED | OUTPATIENT
Start: 2024-08-15 | End: 2024-08-15

## 2024-08-15 RX ORDER — PROCHLORPERAZINE MALEATE 5 MG
10 TABLET ORAL EVERY 6 HOURS PRN
Status: DISCONTINUED | OUTPATIENT
Start: 2024-08-15 | End: 2024-08-17 | Stop reason: HOSPADM

## 2024-08-15 RX ORDER — ONDANSETRON 4 MG/1
4 TABLET, ORALLY DISINTEGRATING ORAL EVERY 30 MIN PRN
Status: DISCONTINUED | OUTPATIENT
Start: 2024-08-15 | End: 2024-08-15 | Stop reason: HOSPADM

## 2024-08-15 RX ORDER — HYDROMORPHONE HCL IN WATER/PF 6 MG/30 ML
0.4 PATIENT CONTROLLED ANALGESIA SYRINGE INTRAVENOUS
Status: DISCONTINUED | OUTPATIENT
Start: 2024-08-15 | End: 2024-08-17 | Stop reason: HOSPADM

## 2024-08-15 RX ORDER — TRAZODONE HYDROCHLORIDE 50 MG/1
50 TABLET, FILM COATED ORAL
Status: DISCONTINUED | OUTPATIENT
Start: 2024-08-15 | End: 2024-08-17 | Stop reason: HOSPADM

## 2024-08-15 RX ORDER — HYDROMORPHONE HCL IN WATER/PF 6 MG/30 ML
0.4 PATIENT CONTROLLED ANALGESIA SYRINGE INTRAVENOUS EVERY 5 MIN PRN
Status: DISCONTINUED | OUTPATIENT
Start: 2024-08-15 | End: 2024-08-15

## 2024-08-15 RX ORDER — SODIUM CHLORIDE, SODIUM LACTATE, POTASSIUM CHLORIDE, CALCIUM CHLORIDE 600; 310; 30; 20 MG/100ML; MG/100ML; MG/100ML; MG/100ML
INJECTION, SOLUTION INTRAVENOUS CONTINUOUS PRN
Status: DISCONTINUED | OUTPATIENT
Start: 2024-08-15 | End: 2024-08-15

## 2024-08-15 RX ORDER — OXYCODONE HYDROCHLORIDE 10 MG/1
10 TABLET ORAL
Status: DISCONTINUED | OUTPATIENT
Start: 2024-08-15 | End: 2024-08-15 | Stop reason: HOSPADM

## 2024-08-15 RX ORDER — ONDANSETRON 2 MG/ML
4 INJECTION INTRAMUSCULAR; INTRAVENOUS EVERY 30 MIN PRN
Status: DISCONTINUED | OUTPATIENT
Start: 2024-08-15 | End: 2024-08-15 | Stop reason: HOSPADM

## 2024-08-15 RX ORDER — AMOXICILLIN 250 MG
1 CAPSULE ORAL 2 TIMES DAILY
Status: DISCONTINUED | OUTPATIENT
Start: 2024-08-15 | End: 2024-08-17 | Stop reason: HOSPADM

## 2024-08-15 RX ORDER — HYDROMORPHONE HCL IN WATER/PF 6 MG/30 ML
0.2 PATIENT CONTROLLED ANALGESIA SYRINGE INTRAVENOUS EVERY 5 MIN PRN
Status: DISCONTINUED | OUTPATIENT
Start: 2024-08-15 | End: 2024-08-15

## 2024-08-15 RX ORDER — NALOXONE HYDROCHLORIDE 0.4 MG/ML
0.1 INJECTION, SOLUTION INTRAMUSCULAR; INTRAVENOUS; SUBCUTANEOUS
Status: DISCONTINUED | OUTPATIENT
Start: 2024-08-15 | End: 2024-08-15

## 2024-08-15 RX ORDER — LIDOCAINE 40 MG/G
CREAM TOPICAL
Status: DISCONTINUED | OUTPATIENT
Start: 2024-08-15 | End: 2024-08-17 | Stop reason: HOSPADM

## 2024-08-15 RX ORDER — LIDOCAINE HYDROCHLORIDE 20 MG/ML
INJECTION, SOLUTION INFILTRATION; PERINEURAL PRN
Status: DISCONTINUED | OUTPATIENT
Start: 2024-08-15 | End: 2024-08-15

## 2024-08-15 RX ORDER — OXYCODONE HYDROCHLORIDE 5 MG/1
5 TABLET ORAL EVERY 4 HOURS PRN
Status: DISCONTINUED | OUTPATIENT
Start: 2024-08-15 | End: 2024-08-17 | Stop reason: HOSPADM

## 2024-08-15 RX ORDER — NALOXONE HYDROCHLORIDE 0.4 MG/ML
0.1 INJECTION, SOLUTION INTRAMUSCULAR; INTRAVENOUS; SUBCUTANEOUS
Status: DISCONTINUED | OUTPATIENT
Start: 2024-08-15 | End: 2024-08-15 | Stop reason: HOSPADM

## 2024-08-15 RX ORDER — ONDANSETRON 2 MG/ML
4 INJECTION INTRAMUSCULAR; INTRAVENOUS EVERY 30 MIN PRN
Status: DISCONTINUED | OUTPATIENT
Start: 2024-08-15 | End: 2024-08-15

## 2024-08-15 RX ORDER — OXYCODONE HYDROCHLORIDE 5 MG/1
5 TABLET ORAL
Status: COMPLETED | OUTPATIENT
Start: 2024-08-15 | End: 2024-08-15

## 2024-08-15 RX ORDER — OXYCODONE HYDROCHLORIDE 10 MG/1
10 TABLET ORAL EVERY 4 HOURS PRN
Status: DISCONTINUED | OUTPATIENT
Start: 2024-08-15 | End: 2024-08-17 | Stop reason: HOSPADM

## 2024-08-15 RX ORDER — POLYETHYLENE GLYCOL 3350 17 G/17G
17 POWDER, FOR SOLUTION ORAL DAILY
Status: DISCONTINUED | OUTPATIENT
Start: 2024-08-16 | End: 2024-08-17 | Stop reason: HOSPADM

## 2024-08-15 RX ORDER — SODIUM CHLORIDE, SODIUM LACTATE, POTASSIUM CHLORIDE, CALCIUM CHLORIDE 600; 310; 30; 20 MG/100ML; MG/100ML; MG/100ML; MG/100ML
INJECTION, SOLUTION INTRAVENOUS CONTINUOUS
Status: DISCONTINUED | OUTPATIENT
Start: 2024-08-15 | End: 2024-08-15

## 2024-08-15 RX ORDER — ONDANSETRON 4 MG/1
4 TABLET, ORALLY DISINTEGRATING ORAL EVERY 6 HOURS PRN
Status: DISCONTINUED | OUTPATIENT
Start: 2024-08-15 | End: 2024-08-17 | Stop reason: HOSPADM

## 2024-08-15 RX ORDER — FENTANYL CITRATE 50 UG/ML
INJECTION, SOLUTION INTRAMUSCULAR; INTRAVENOUS PRN
Status: DISCONTINUED | OUTPATIENT
Start: 2024-08-15 | End: 2024-08-15

## 2024-08-15 RX ORDER — HYDROMORPHONE HCL IN WATER/PF 6 MG/30 ML
0.2 PATIENT CONTROLLED ANALGESIA SYRINGE INTRAVENOUS
Status: DISCONTINUED | OUTPATIENT
Start: 2024-08-15 | End: 2024-08-17 | Stop reason: HOSPADM

## 2024-08-15 RX ORDER — DEXAMETHASONE SODIUM PHOSPHATE 4 MG/ML
INJECTION, SOLUTION INTRA-ARTICULAR; INTRALESIONAL; INTRAMUSCULAR; INTRAVENOUS; SOFT TISSUE PRN
Status: DISCONTINUED | OUTPATIENT
Start: 2024-08-15 | End: 2024-08-15

## 2024-08-15 RX ORDER — PROPOFOL 10 MG/ML
INJECTION, EMULSION INTRAVENOUS PRN
Status: DISCONTINUED | OUTPATIENT
Start: 2024-08-15 | End: 2024-08-15

## 2024-08-15 RX ORDER — ONDANSETRON 2 MG/ML
4 INJECTION INTRAMUSCULAR; INTRAVENOUS EVERY 6 HOURS PRN
Status: DISCONTINUED | OUTPATIENT
Start: 2024-08-15 | End: 2024-08-17 | Stop reason: HOSPADM

## 2024-08-15 RX ORDER — BUPIVACAINE HYDROCHLORIDE AND EPINEPHRINE 2.5; 5 MG/ML; UG/ML
INJECTION, SOLUTION INFILTRATION; PERINEURAL PRN
Status: DISCONTINUED | OUTPATIENT
Start: 2024-08-15 | End: 2024-08-15 | Stop reason: HOSPADM

## 2024-08-15 RX ORDER — CEFAZOLIN SODIUM/WATER 2 G/20 ML
2 SYRINGE (ML) INTRAVENOUS
Status: COMPLETED | OUTPATIENT
Start: 2024-08-15 | End: 2024-08-15

## 2024-08-15 RX ORDER — ONDANSETRON 4 MG/1
4 TABLET, ORALLY DISINTEGRATING ORAL EVERY 30 MIN PRN
Status: DISCONTINUED | OUTPATIENT
Start: 2024-08-15 | End: 2024-08-15

## 2024-08-15 RX ORDER — OXYCODONE HYDROCHLORIDE 5 MG/1
5 TABLET ORAL EVERY 6 HOURS PRN
Qty: 12 TABLET | Refills: 0 | Status: SHIPPED | OUTPATIENT
Start: 2024-08-15 | End: 2024-08-17

## 2024-08-15 RX ORDER — BISACODYL 10 MG
10 SUPPOSITORY, RECTAL RECTAL DAILY PRN
Status: DISCONTINUED | OUTPATIENT
Start: 2024-08-18 | End: 2024-08-17 | Stop reason: HOSPADM

## 2024-08-15 RX ORDER — TOLTERODINE 4 MG/1
4 CAPSULE, EXTENDED RELEASE ORAL DAILY
Status: COMPLETED | OUTPATIENT
Start: 2024-08-16 | End: 2024-08-17

## 2024-08-15 RX ORDER — NALOXONE HYDROCHLORIDE 0.4 MG/ML
0.4 INJECTION, SOLUTION INTRAMUSCULAR; INTRAVENOUS; SUBCUTANEOUS
Status: DISCONTINUED | OUTPATIENT
Start: 2024-08-15 | End: 2024-08-17 | Stop reason: HOSPADM

## 2024-08-15 RX ORDER — DEXMEDETOMIDINE HYDROCHLORIDE 4 UG/ML
INJECTION, SOLUTION INTRAVENOUS CONTINUOUS PRN
Status: DISCONTINUED | OUTPATIENT
Start: 2024-08-15 | End: 2024-08-15

## 2024-08-15 RX ORDER — NALOXONE HYDROCHLORIDE 0.4 MG/ML
0.2 INJECTION, SOLUTION INTRAMUSCULAR; INTRAVENOUS; SUBCUTANEOUS
Status: DISCONTINUED | OUTPATIENT
Start: 2024-08-15 | End: 2024-08-17 | Stop reason: HOSPADM

## 2024-08-15 RX ORDER — FENTANYL CITRATE 50 UG/ML
25 INJECTION, SOLUTION INTRAMUSCULAR; INTRAVENOUS EVERY 5 MIN PRN
Status: DISCONTINUED | OUTPATIENT
Start: 2024-08-15 | End: 2024-08-15

## 2024-08-15 RX ORDER — ACETAMINOPHEN 325 MG/1
650 TABLET ORAL EVERY 4 HOURS PRN
Status: DISCONTINUED | OUTPATIENT
Start: 2024-08-18 | End: 2024-08-17 | Stop reason: HOSPADM

## 2024-08-15 RX ORDER — METHOCARBAMOL 500 MG/1
500 TABLET, FILM COATED ORAL 4 TIMES DAILY PRN
Status: DISCONTINUED | OUTPATIENT
Start: 2024-08-15 | End: 2024-08-17 | Stop reason: HOSPADM

## 2024-08-15 RX ORDER — ONDANSETRON 2 MG/ML
INJECTION INTRAMUSCULAR; INTRAVENOUS PRN
Status: DISCONTINUED | OUTPATIENT
Start: 2024-08-15 | End: 2024-08-15

## 2024-08-15 RX ORDER — CEFAZOLIN SODIUM/WATER 2 G/20 ML
2 SYRINGE (ML) INTRAVENOUS SEE ADMIN INSTRUCTIONS
Status: DISCONTINUED | OUTPATIENT
Start: 2024-08-15 | End: 2024-08-15 | Stop reason: HOSPADM

## 2024-08-15 RX ORDER — FENTANYL CITRATE 50 UG/ML
50 INJECTION, SOLUTION INTRAMUSCULAR; INTRAVENOUS EVERY 5 MIN PRN
Status: DISCONTINUED | OUTPATIENT
Start: 2024-08-15 | End: 2024-08-15

## 2024-08-15 RX ORDER — ACETAMINOPHEN 325 MG/1
975 TABLET ORAL EVERY 8 HOURS
Status: DISCONTINUED | OUTPATIENT
Start: 2024-08-15 | End: 2024-08-17 | Stop reason: HOSPADM

## 2024-08-15 RX ORDER — SODIUM CHLORIDE 9 MG/ML
INJECTION, SOLUTION INTRAVENOUS CONTINUOUS
Status: DISCONTINUED | OUTPATIENT
Start: 2024-08-15 | End: 2024-08-16

## 2024-08-15 RX ORDER — DEXAMETHASONE SODIUM PHOSPHATE 4 MG/ML
4 INJECTION, SOLUTION INTRA-ARTICULAR; INTRALESIONAL; INTRAMUSCULAR; INTRAVENOUS; SOFT TISSUE
Status: DISCONTINUED | OUTPATIENT
Start: 2024-08-15 | End: 2024-08-15 | Stop reason: HOSPADM

## 2024-08-15 RX ORDER — SENNA AND DOCUSATE SODIUM 50; 8.6 MG/1; MG/1
1 TABLET, FILM COATED ORAL AT BEDTIME
Qty: 7 TABLET | Refills: 0 | Status: SHIPPED | OUTPATIENT
Start: 2024-08-15

## 2024-08-15 RX ORDER — DEXAMETHASONE SODIUM PHOSPHATE 4 MG/ML
4 INJECTION, SOLUTION INTRA-ARTICULAR; INTRALESIONAL; INTRAMUSCULAR; INTRAVENOUS; SOFT TISSUE
Status: DISCONTINUED | OUTPATIENT
Start: 2024-08-15 | End: 2024-08-15

## 2024-08-15 RX ADMIN — FENTANYL CITRATE 100 MCG: 50 INJECTION INTRAMUSCULAR; INTRAVENOUS at 12:23

## 2024-08-15 RX ADMIN — PROPOFOL 50 MG: 10 INJECTION, EMULSION INTRAVENOUS at 13:11

## 2024-08-15 RX ADMIN — PHENYLEPHRINE HYDROCHLORIDE 100 MCG: 10 INJECTION INTRAVENOUS at 14:01

## 2024-08-15 RX ADMIN — LIDOCAINE HYDROCHLORIDE 100 MG: 20 INJECTION, SOLUTION INFILTRATION; PERINEURAL at 12:23

## 2024-08-15 RX ADMIN — HYDROMORPHONE HYDROCHLORIDE 0.4 MG: 0.2 INJECTION, SOLUTION INTRAMUSCULAR; INTRAVENOUS; SUBCUTANEOUS at 16:56

## 2024-08-15 RX ADMIN — DEXAMETHASONE SODIUM PHOSPHATE 8 MG: 4 INJECTION, SOLUTION INTRA-ARTICULAR; INTRALESIONAL; INTRAMUSCULAR; INTRAVENOUS; SOFT TISSUE at 12:23

## 2024-08-15 RX ADMIN — FENTANYL CITRATE 50 MCG: 50 INJECTION, SOLUTION INTRAMUSCULAR; INTRAVENOUS at 16:28

## 2024-08-15 RX ADMIN — SODIUM CHLORIDE, POTASSIUM CHLORIDE, SODIUM LACTATE AND CALCIUM CHLORIDE: 600; 310; 30; 20 INJECTION, SOLUTION INTRAVENOUS at 12:17

## 2024-08-15 RX ADMIN — HYDROMORPHONE HYDROCHLORIDE 0.2 MG: 0.2 INJECTION, SOLUTION INTRAMUSCULAR; INTRAVENOUS; SUBCUTANEOUS at 18:46

## 2024-08-15 RX ADMIN — DEXMEDETOMIDINE HYDROCHLORIDE 0.2 MCG/KG/HR: 4 INJECTION, SOLUTION INTRAVENOUS at 13:04

## 2024-08-15 RX ADMIN — HYDROMORPHONE HYDROCHLORIDE 0.4 MG: 0.2 INJECTION, SOLUTION INTRAMUSCULAR; INTRAVENOUS; SUBCUTANEOUS at 16:37

## 2024-08-15 RX ADMIN — ACETAMINOPHEN 975 MG: 325 TABLET, FILM COATED ORAL at 19:20

## 2024-08-15 RX ADMIN — HYDROMORPHONE HYDROCHLORIDE 0.2 MG: 0.2 INJECTION, SOLUTION INTRAMUSCULAR; INTRAVENOUS; SUBCUTANEOUS at 18:17

## 2024-08-15 RX ADMIN — HYDROMORPHONE HYDROCHLORIDE 0.2 MG: 0.2 INJECTION, SOLUTION INTRAMUSCULAR; INTRAVENOUS; SUBCUTANEOUS at 17:52

## 2024-08-15 RX ADMIN — FENTANYL CITRATE 50 MCG: 50 INJECTION INTRAMUSCULAR; INTRAVENOUS at 13:25

## 2024-08-15 RX ADMIN — SODIUM CHLORIDE, POTASSIUM CHLORIDE, SODIUM LACTATE AND CALCIUM CHLORIDE: 600; 310; 30; 20 INJECTION, SOLUTION INTRAVENOUS at 13:44

## 2024-08-15 RX ADMIN — Medication 2 G: at 12:23

## 2024-08-15 RX ADMIN — FENTANYL CITRATE 50 MCG: 50 INJECTION, SOLUTION INTRAMUSCULAR; INTRAVENOUS at 16:16

## 2024-08-15 RX ADMIN — HYDROMORPHONE HYDROCHLORIDE 0.2 MG: 0.2 INJECTION, SOLUTION INTRAMUSCULAR; INTRAVENOUS; SUBCUTANEOUS at 17:35

## 2024-08-15 RX ADMIN — Medication 20 MG: at 13:57

## 2024-08-15 RX ADMIN — HYDROMORPHONE HYDROCHLORIDE 0.5 MG: 1 INJECTION, SOLUTION INTRAMUSCULAR; INTRAVENOUS; SUBCUTANEOUS at 14:13

## 2024-08-15 RX ADMIN — OXYCODONE HYDROCHLORIDE 5 MG: 5 TABLET ORAL at 19:21

## 2024-08-15 RX ADMIN — PHENYLEPHRINE HYDROCHLORIDE 100 MCG: 10 INJECTION INTRAVENOUS at 14:22

## 2024-08-15 RX ADMIN — PROPOFOL 150 MG: 10 INJECTION, EMULSION INTRAVENOUS at 12:23

## 2024-08-15 RX ADMIN — ONDANSETRON 4 MG: 2 INJECTION INTRAMUSCULAR; INTRAVENOUS at 14:05

## 2024-08-15 RX ADMIN — Medication 50 MG: at 12:23

## 2024-08-15 RX ADMIN — HEPARIN SODIUM 5000 UNITS: 5000 INJECTION, SOLUTION INTRAVENOUS; SUBCUTANEOUS at 11:21

## 2024-08-15 RX ADMIN — HYDROMORPHONE HYDROCHLORIDE 0.2 MG: 0.2 INJECTION, SOLUTION INTRAMUSCULAR; INTRAVENOUS; SUBCUTANEOUS at 19:02

## 2024-08-15 RX ADMIN — SODIUM CHLORIDE: 9 INJECTION, SOLUTION INTRAVENOUS at 17:01

## 2024-08-15 RX ADMIN — SUGAMMADEX 100 MG: 100 INJECTION, SOLUTION INTRAVENOUS at 15:44

## 2024-08-15 RX ADMIN — MIDAZOLAM 2 MG: 1 INJECTION INTRAMUSCULAR; INTRAVENOUS at 12:11

## 2024-08-15 RX ADMIN — PHENYLEPHRINE HYDROCHLORIDE 100 MCG: 10 INJECTION INTRAVENOUS at 13:18

## 2024-08-15 RX ADMIN — FENTANYL CITRATE 50 MCG: 50 INJECTION INTRAMUSCULAR; INTRAVENOUS at 13:11

## 2024-08-15 RX ADMIN — Medication 20 MG: at 13:10

## 2024-08-15 ASSESSMENT — ACTIVITIES OF DAILY LIVING (ADL)
ADLS_ACUITY_SCORE: 35
ADLS_ACUITY_SCORE: 36
ADLS_ACUITY_SCORE: 36
ADLS_ACUITY_SCORE: 35
ADLS_ACUITY_SCORE: 36
ADLS_ACUITY_SCORE: 35
ADLS_ACUITY_SCORE: 33
ADLS_ACUITY_SCORE: 35
ADLS_ACUITY_SCORE: 36

## 2024-08-15 NOTE — ANESTHESIA PREPROCEDURE EVALUATION
"Anesthesia Pre-Procedure Evaluation    Patient: Angy Lino   MRN: [id number] : 1990        Procedure : Procedure(s):  Minimal Depth Vaginoplasty          Past Medical History:   Diagnosis Date     Anxiety      Autism      Gender dysphoria       Past Surgical History:   Procedure Laterality Date     MAMMOPLASTY AUGMENTATION Bilateral 2023    Procedure: BILATERAL FEMINIZING BREAST AUGMENTATION WITH USE OF SMOOTH ROUND SILICONE IMPLANTS;  Surgeon: Cam Real MD;  Location: [facility] OR      No Known Allergies   Social History     Tobacco Use     Smoking status: Never     Smokeless tobacco: Never   Substance Use Topics     Alcohol use: Not Currently      Wt Readings from Last 1 Encounters:   08/15/24 83.1 kg (183 lb 3.2 oz)        Anesthesia Evaluation   Pt has not had prior anesthetic         ROS/MED HX  ENT/Pulmonary:       Neurologic:       Cardiovascular:       METS/Exercise Tolerance: >4 METS    Hematologic:       Musculoskeletal:       GI/Hepatic:    (-) GERD   Renal/Genitourinary:       Endo:       Psychiatric/Substance Use:       Infectious Disease:       Malignancy:       Other:            Physical Exam    Airway        Mallampati: I   TM distance: > 3 FB   Neck ROM: full   Mouth opening: > 3 cm    Respiratory Devices and Support         Dental       (+) Minor Abnormalities - some fillings, tiny chips      Cardiovascular             Pulmonary               OUTSIDE LABS:  CBC: No results found for: \"WBC\", \"HGB\", \"HCT\", \"PLT\"  BMP:   Lab Results   Component Value Date    GLC 85 08/15/2024     COAGS: No results found for: \"PTT\", \"INR\", \"FIBR\"  POC: No results found for: \"BGM\", \"HCG\", \"HCGS\"  HEPATIC: No results found for: \"ALBUMIN\", \"PROTTOTAL\", \"ALT\", \"AST\", \"GGT\", \"ALKPHOS\", \"BILITOTAL\", \"BILIDIRECT\", \"HOLLAND\"  OTHER: No results found for: \"PH\", \"LACT\", \"A1C\", \"JOAQUINA\", \"PHOS\", \"MAG\", \"LIPASE\", \"AMYLASE\", \"TSH\", \"T4\", \"T3\", \"CRP\", \"SED\"    Anesthesia Plan    ASA Status:  2    NPO Status:  " "NPO Appropriate    Anesthesia Type: General.     - Airway: ETT   Induction: Intravenous.   Maintenance: Balanced.   Techniques and Equipment:     - Lines/Monitors: 2nd IV     Consents    Anesthesia Plan(s) and associated risks, benefits, and realistic alternatives discussed. Questions answered and patient/representative(s) expressed understanding.     - Discussed:     - Discussed with:  Patient      - Extended Intubation/Ventilatory Support Discussed: No.      - Patient is DNR/DNI Status: No     Use of blood products discussed: No .     Postoperative Care    Pain management: Multi-modal analgesia.   PONV prophylaxis: Ondansetron (or other 5HT-3), Dexamethasone or Solumedrol     Comments:               Mariblel Enriquez MD    I have reviewed the pertinent notes and labs in the chart from the past 30 days and (re)examined the patient.  Any updates or changes from those notes are reflected in this note.              # Overweight: Estimated body mass index is 27.05 kg/m  as calculated from the following:    Height as of this encounter: 1.753 m (5' 9\").    Weight as of this encounter: 83.1 kg (183 lb 3.2 oz).      "

## 2024-08-15 NOTE — BRIEF OP NOTE
[facility]    Brief Operative Note    Pre-operative diagnosis: Gender dysphoria [F64.9]  Post-operative diagnosis Same as pre-operative diagnosis    Procedure: Minimal Depth Vaginoplasty, N/A - Vulva    Surgeon: Surgeons and Role:     * Ever Alexander MD - Primary     * Pauline Sanchez MD - Resident - Assisting     * Med Cohen MD - Resident - Assisting  Anesthesia: General   Estimated Blood Loss: 250 mL from 8/15/2024 12:14 PM to 8/15/2024  3:56 PM    Drains: 16Fr Callaway catheter, 10Fr BERTHA drain (R and L labia)    Specimens:   ID Type Source Tests Collected by Time Destination   1 : Testes, Bilateral Tissue Testes, Bilateral SURGICAL PATHOLOGY EXAM Ever Alexander MD 8/15/2024  1:14 PM    2 : Penis and Urethra Tissue Penis SURGICAL PATHOLOGY EXAM Ever Alexander MD 8/15/2024  2:09 PM    3 : Scrotum Tissue Scrotum SURGICAL PATHOLOGY EXAM Ever Alexander MD 8/15/2024  2:51 PM      Findings: Minimal depth vaginoplasty, no unexpected findings. Labia minora created.   Complications: None.  Implants: * No implants in log *    Plan:  - admit to urology for minimal depth pathway  - no abx  - ADAT, IVF, bowel regimen  - PT consult for penguin walk  - plan for reveal on POD#2

## 2024-08-15 NOTE — OP NOTE
OPERATIVE REPORT  August 15, 2024     PREOPERATIVE DIAGNOSIS: Gender Dysphoria  POSTOPERATIVE DIAGNOSIS: Gender Dysphoria     PROCEDURE:   Minimal depth penile inversion vaginoplasty, inclusive of:  Clitoroplasty  Bilateral orchiectomy  Total penectomy  Urethroplasty  Construction of minimal depth artificial vagina  Scrotectomy  Bilateral labiaplasty via adjacent tissue transfer of scrotal and mons skin flaps (12 x 5 cm on left, 12 x 5 cm on right)    SURGEON: Ever Alexander MD    FELLOW: Med Cohen MD    ASSISTANTS: Pauline Sanchez MD    ANESTHESIA: General endotracheal    EBL: 250 mL    DRAINS:   16F urethral Callaway catheter  10F round drains x2 within the bilateral labia    INDICATIONS:   Angy Lino is a 33 year old year old transgender female with gender dysphoria. She meets WPATH guidelines for gender affirming surgery and has elected for a minimal depth vaginoplasty. The risks, benefits and alternatives of the multiple treatment options were discussed with her and she elected to proceed. Specifically, this included, but was not limited to: wound infection, anesthesia risks, blood clots, urethral stricture, inadequate vaginal depth, and rectal injury. All questions were answered and informed consent was obtained.      DESCRIPTION OF PROCEDURE: The patient was taken to the operating room and placed supine on the operating table. SCDs and preoperative subcutaneous heparin were utilized for VTE prophylaxis. Ancef was given as antibiotic prophylaxis. General anesthesia was induced and an endotracheal tube was placed. The patient was then placed in lithotomy. The genitalia and lower abdomen were prepped and draped in a sterile fashion. A timeout was performed confirming the correct patient and procedure.     We began by making a circumcising skin incision with care to leave a mucosal collar beneath the glans. This was dissected down to Sotelo's fascia. The penis was degloved, which left a circumferential  penile skin tube.       We then made a midline scrotal incision, which was taken down to the level of the urethra. The penis was inverted, thereby leaving the skin tube superiorly. We then performed a bilateral orchiectomy. The left testicle was dissected with combination of blunt dissection and cautery to the level of the inguinal ring. The cord was split in half and each side was tied with a 0-Silk suture ligature. We then ligated the shared stump of the spermatic cord with 0-Silk suture. The procedure was repeated on the right side. Both testicles were passed off as specimens.    The anterior urethra was then freed from the corporal bodies distally.     A W-shaped incision was marked along the glans to create the clitoris. This was incised with cut current, making sure to preserve a healthy flap of tissue for the dorsal glans (the later neoclitoris). The ventral glans and distal urethral plate/fossa navicularis were freed from the underlying corporal bodies. This left the distal anterior urethra completed freed and separate from the corporal bodies. This plane of dissection was carried proximally to the distal bulbar urethra.    The neoclitoris was then shaped into a narrow tubular structure and the mucosal collar was shaped to form a clitoral huber.     We then dissected the cavernosal bodies to the level of the pubic symphysis. The corporal bodies were split open distally this was carried proximally to this level. The spongy erectile tissue was removed or cauterized. This left a stump of the corporal bodies proximally, which was closed with running PDS.      Dissection was continued around the bulbospongiosus muscle to identify the ischiocavernosal muscles. The bulbospongiosus muscle was sharply taken off the bulbar urethra and laterally, and left intact posteriorly. At this point, sharp dissection was continued along the base of the bulbar urethra. The central tendon was released, which allowed for urethral  mobility.       Using sharp dissection a space was developed between the urethra and prostate anteriorly and the rectum posteriorly. We stopped after division of the central tendon and some infraprostatic dissection to allow for an internalized vaginal canal without significant depth.       The neoclitoris was at this point on a stalk comprising the dorsal aspect of the corporal bodies. This was folded such that the neoclitoris was positioned at the pubis. The corporal bodies and clitoris were tacked in position using 3-0 Vicryl.      We then turned our attention to completing the urethroplasty. The urethra was transected at the level of the distal bulbar urethra. The urethra was spatulated for about 4 cm ventrally, allowing the dorsal aspect to be sewn to the neoclitoral and clitoral huber skin dorsally using 3-0 Vicryl interrupted suture - thus creating a mucosal, lubricated inner vulvar vestibule. The bulbar urethra was debulked and oversewn. The urethral spatulation ended at the distal bulbar urethra.      We then turned our attention to creation of a midline opening superior to the penile skin tube. This midline opening would later become the opening for the clitoris and urethra.     A small portion of the vaginal tube was excised to create an appropriate sized penile skin flap for the small, minimal depth canal. The tip of the vaginal tube was closed using absorbable suture and sutured into place at the depth of our vaginal dissection with multiple absorbable interrupted sutures. We placed PDS sutures to tack the skin tube to its appropriate location.    A drain was placed underneath each labia majora for two drains total, and each drain sutured in place with a 3-0 nylon. I designed bilateral labia majora using anteriorly perfused scrotal and mons skin. These labial flaps were 12x5cm per side. These were fashioned to create a feminine appearing labia. They were advanced posteriorly and sutured into place using  3-0 Vicryl and Monocryl sutures in two layers.     The ventral urethra was then sutured to the posterior aspect of the superiorly placed opening to complete the urethroplasty using 4-0 Vicryl suture. There was excellent urethral mucosal eversion with pink mucosa evident.     We then made superficial incisions on either side of the superior aspect of the clitoral huber. We placed plicating sutures using 3-0 PDS to create labia minora and to create further clitoral hooding. The superficial incisions were then closed with running 5-0 Monocryl, thereby burying the PDS sutures.    At the conclusion of the procedure, the patient had a appearance of normal feminized genitalia with clitoral hooding. The clitoris was pink and viable. A 16F Callaway was placed per urethra and entry into the bladder was confirmed. 10 mL were placed in the balloon. Xeroform packing was placed into the neovagina and between the clitoral huber and neoclitoris itself. We placed three Prolene sutures on either side of the labia and used these to secure a pressure dressing.     All counts were correct at the end of the case. The patient was then extubated, awakened, and transferred to the postop area in stable condition, having suffered no untoward event.    As attending surgeon, Ever OLIVEIRA MD, was scrubbed and present for all critical portions of all procedures above and immediately available for the entire procedure.

## 2024-08-15 NOTE — ANESTHESIA PROCEDURE NOTES
Airway       Patient location during procedure: OR       Procedure Start/Stop Times: 8/15/2024 12:26 PM  Staff -        CRNA: Mariah Fritz APRN CRNA       Performed By: CRNA  Consent for Airway        Urgency: elective  Indications and Patient Condition       Indications for airway management: [place]-procedural       Induction type:intravenous       Mask difficulty assessment: 1 - vent by mask    Final Airway Details       Final airway type: endotracheal airway       Successful airway: ETT - single and Oral  Endotracheal Airway Details        ETT size (mm): 7.5       Cuffed: yes       Cuff volume (mL): 9       Successful intubation technique: direct laryngoscopy       DL Blade Type: Chin 2       Grade View of Cords: 1       Adjucts: stylet       Position: Right       Measured from: lips       Secured at (cm): 22       Bite block used: None    Post intubation assessment        Placement verified by: capnometry, equal breath sounds and chest rise        Number of attempts at approach: 1       Secured with: tape       Ease of procedure: easy       Dentition: Intact and Unchanged    Medication(s) Administered   Medication Administration Time: 8/15/2024 12:26 PM

## 2024-08-15 NOTE — ANESTHESIA POSTPROCEDURE EVALUATION
Patient: Angy Lino    Procedure: Procedure(s):  Minimal Depth Vaginoplasty       Anesthesia Type:  General    Note:  Disposition: Admission   Postop Pain Control: Uneventful            Sign Out: Well controlled pain   PONV: No   Neuro/Psych: Uneventful            Sign Out: Acceptable/Baseline neuro status   Airway/Respiratory: Uneventful            Sign Out: Acceptable/Baseline resp. status   CV/Hemodynamics: Uneventful            Sign Out: Acceptable CV status; No obvious hypovolemia; No obvious fluid overload   Other NRE: NONE   DID A NON-ROUTINE EVENT OCCUR? No           Last vitals:  Vitals Value Taken Time   /77 08/15/24 1800   Temp 36.9  C (98.5  F) 08/15/24 1600   Pulse 111 08/15/24 1805   Resp 15 08/15/24 1805   SpO2 91 % 08/15/24 1805   Vitals shown include unfiled device data.    Electronically Signed By: Med Castorena MD  August 15, 2024  6:05 PM

## 2024-08-15 NOTE — ANESTHESIA CARE TRANSFER NOTE
Patient: Angy Lino    Procedure: Procedure(s):  Minimal Depth Vaginoplasty       Diagnosis: Gender dysphoria [F64.9]  Diagnosis Additional Information: No value filed.    Anesthesia Type:   General     Note:    Oropharynx: spontaneously breathing  Level of Consciousness: awake and drowsy  Oxygen Supplementation: face mask    Independent Airway: airway patency satisfactory and stable  Dentition: dentition unchanged  Vital Signs Stable: post-procedure vital signs reviewed and stable  Report to RN Given: handoff report given  Patient transferred to: PACU    Handoff Report: Identifed the Patient, Identified the Reponsible Provider, Reviewed the pertinent medical history, Discussed the surgical course, Reviewed Intra-OP anesthesia mangement and issues during anesthesia, Set expectations for post-procedure period and Allowed opportunity for questions and acknowledgement of understanding      Vitals:  Vitals Value Taken Time   /62 08/15/24 1600   Temp     Pulse 114 08/15/24 1605   Resp 21 08/15/24 1605   SpO2 96 % 08/15/24 1605   Vitals shown include unfiled device data.    Electronically Signed By: NIDA Guzmán CRNA  August 15, 2024  4:05 PM

## 2024-08-16 ENCOUNTER — APPOINTMENT (OUTPATIENT)
Dept: PHYSICAL THERAPY | Facility: CLINIC | Age: 34
DRG: 876 | End: 2024-08-16
Attending: UROLOGY
Payer: COMMERCIAL

## 2024-08-16 LAB
ANION GAP SERPL CALCULATED.3IONS-SCNC: 8 MMOL/L (ref 7–15)
BUN SERPL-MCNC: 8.8 MG/DL (ref 6–20)
CALCIUM SERPL-MCNC: 8.3 MG/DL (ref 8.8–10.4)
CHLORIDE SERPL-SCNC: 105 MMOL/L (ref 98–107)
CREAT SERPL-MCNC: 0.71 MG/DL (ref 0.51–1.17)
EGFRCR SERPLBLD CKD-EPI 2021: ABNORMAL ML/MIN/{1.73_M2}
ERYTHROCYTE [DISTWIDTH] IN BLOOD BY AUTOMATED COUNT: 12.4 % (ref 10–15)
GLUCOSE SERPL-MCNC: 123 MG/DL (ref 70–99)
HCO3 SERPL-SCNC: 24 MMOL/L (ref 22–29)
HCT VFR BLD AUTO: 35.4 % (ref 35–53)
HGB BLD-MCNC: 11.4 G/DL (ref 13.3–17.7)
MCH RBC QN AUTO: 29.6 PG (ref 26.5–33)
MCHC RBC AUTO-ENTMCNC: 32.2 G/DL (ref 31.5–36.5)
MCV RBC AUTO: 92 FL (ref 78–100)
PLATELET # BLD AUTO: 167 10E3/UL (ref 150–450)
POTASSIUM SERPL-SCNC: 4 MMOL/L (ref 3.4–5.3)
RBC # BLD AUTO: 3.85 10E6/UL (ref 3.8–5.9)
SODIUM SERPL-SCNC: 137 MMOL/L (ref 135–145)
WBC # BLD AUTO: 9.3 10E3/UL (ref 4–11)

## 2024-08-16 PROCEDURE — 250N000013 HC RX MED GY IP 250 OP 250 PS 637: Performed by: STUDENT IN AN ORGANIZED HEALTH CARE EDUCATION/TRAINING PROGRAM

## 2024-08-16 PROCEDURE — 85027 COMPLETE CBC AUTOMATED: CPT | Performed by: STUDENT IN AN ORGANIZED HEALTH CARE EDUCATION/TRAINING PROGRAM

## 2024-08-16 PROCEDURE — 97116 GAIT TRAINING THERAPY: CPT | Mod: GP

## 2024-08-16 PROCEDURE — 97530 THERAPEUTIC ACTIVITIES: CPT | Mod: GP

## 2024-08-16 PROCEDURE — 120N000002 HC R&B MED SURG/OB UMMC

## 2024-08-16 PROCEDURE — 258N000003 HC RX IP 258 OP 636: Performed by: STUDENT IN AN ORGANIZED HEALTH CARE EDUCATION/TRAINING PROGRAM

## 2024-08-16 PROCEDURE — 36415 COLL VENOUS BLD VENIPUNCTURE: CPT | Performed by: STUDENT IN AN ORGANIZED HEALTH CARE EDUCATION/TRAINING PROGRAM

## 2024-08-16 PROCEDURE — 97161 PT EVAL LOW COMPLEX 20 MIN: CPT | Mod: GP

## 2024-08-16 PROCEDURE — 80048 BASIC METABOLIC PNL TOTAL CA: CPT | Performed by: STUDENT IN AN ORGANIZED HEALTH CARE EDUCATION/TRAINING PROGRAM

## 2024-08-16 PROCEDURE — 250N000013 HC RX MED GY IP 250 OP 250 PS 637: Performed by: UROLOGY

## 2024-08-16 RX ADMIN — OXYCODONE HYDROCHLORIDE 5 MG: 5 TABLET ORAL at 00:31

## 2024-08-16 RX ADMIN — POLYETHYLENE GLYCOL 3350 17 G: 17 POWDER, FOR SOLUTION ORAL at 08:22

## 2024-08-16 RX ADMIN — SENNOSIDES AND DOCUSATE SODIUM 1 TABLET: 8.6; 5 TABLET ORAL at 08:22

## 2024-08-16 RX ADMIN — OXYCODONE HYDROCHLORIDE 10 MG: 10 TABLET ORAL at 13:57

## 2024-08-16 RX ADMIN — TOLTERODINE 4 MG: 4 CAPSULE, EXTENDED RELEASE ORAL at 08:22

## 2024-08-16 RX ADMIN — SODIUM CHLORIDE: 9 INJECTION, SOLUTION INTRAVENOUS at 12:04

## 2024-08-16 RX ADMIN — ACETAMINOPHEN 975 MG: 325 TABLET, FILM COATED ORAL at 12:08

## 2024-08-16 RX ADMIN — SENNOSIDES AND DOCUSATE SODIUM 1 TABLET: 8.6; 5 TABLET ORAL at 00:29

## 2024-08-16 RX ADMIN — ACETAMINOPHEN 975 MG: 325 TABLET, FILM COATED ORAL at 19:56

## 2024-08-16 RX ADMIN — SODIUM CHLORIDE: 9 INJECTION, SOLUTION INTRAVENOUS at 02:45

## 2024-08-16 RX ADMIN — ACETAMINOPHEN 975 MG: 325 TABLET, FILM COATED ORAL at 04:18

## 2024-08-16 RX ADMIN — SENNOSIDES AND DOCUSATE SODIUM 1 TABLET: 8.6; 5 TABLET ORAL at 19:56

## 2024-08-16 RX ADMIN — OXYCODONE HYDROCHLORIDE 5 MG: 5 TABLET ORAL at 09:18

## 2024-08-16 RX ADMIN — FLUOXETINE HYDROCHLORIDE 30 MG: 10 CAPSULE ORAL at 08:22

## 2024-08-16 ASSESSMENT — ACTIVITIES OF DAILY LIVING (ADL)
ADLS_ACUITY_SCORE: 25
ADLS_ACUITY_SCORE: 36
ADLS_ACUITY_SCORE: 25
ADLS_ACUITY_SCORE: 36
ADLS_ACUITY_SCORE: 36
ADLS_ACUITY_SCORE: 25

## 2024-08-16 NOTE — PLAN OF CARE
Goal Outcome Evaluation:      Plan of Care Reviewed With: patient    Overall Patient Progress: improvingOverall Patient Progress: improving    Patient is POD 0 minimal depth vaginoplasty. Bolster is in place with moderate drainage. Callaway is intact, VSS, and patient has walked with PT. Patient stated to writer that she is on the Autism spectrum and that she can get overstimulated easily. Writer has clustered cares and kept her lights off and door shut which seems to be helpful. Will continue to monitor

## 2024-08-16 NOTE — PROGRESS NOTES
08/16/24 1530   Appointment Info   Signing Clinician's Name / Credentials (PT) Jeannine Shepherd, PT, DPT   Living Environment   People in Home alone   Current Living Arrangements apartment   Home Accessibility no concerns   Transportation Anticipated car, drives self;family or friend will provide   Living Environment Comments Will be staying with family after hospital discharge, 24/7 support as needed.   Self-Care   Usual Activity Tolerance excellent   Current Activity Tolerance moderate   Regular Exercise No   Equipment Currently Used at Home none   Fall history within last six months no   Activity/Exercise/Self-Care Comment Ind at baseline with all ADLs and iADLs.   General Information   Onset of Illness/Injury or Date of Surgery 08/15/24   Referring Physician Ever Alxeander MD   Patient/Family Therapy Goals Statement (PT) to go home.   Pertinent History of Current Problem (include personal factors and/or comorbidities that impact the POC) 33 year old y/o adult POD#1 s/p minimal depth vaginoplasty.   Existing Precautions/Restrictions   (penguin walk)   Cognition   Affect/Mental Status (Cognition) WFL   Pain Assessment   Patient Currently in Pain Yes, see Vital Sign flowsheet   Integumentary/Edema   Integumentary/Edema other (describe)   Integumentary/Edema Comments   (Expected surgical sites)   Posture    Posture Not impaired   Range of Motion (ROM)   Range of Motion ROM is WFL   ROM Comment Not formally tested due to activity restrictions   Strength (Manual Muscle Testing)   Strength (Manual Muscle Testing) strength is WFL   Bed Mobility   Comment, (Bed Mobility) Min A   Transfers   Comment, (Transfers) CGA   Gait/Stairs (Locomotion)   Comment, (Gait/Stairs) CGA; slow gait speed with knees together, shuffling feet   Balance   Balance Comments Steady on her feet; no LOB or path deviation concerns   Clinical Impression   Criteria for Skilled Therapeutic Intervention Yes, treatment indicated   PT Diagnosis  (PT) imapaired mobility   Influenced by the following impairments pain; acute surgery   Functional limitations due to impairments bed mobility; transfers and gait; stairs   Clinical Presentation (PT Evaluation Complexity) stable   Clinical Presentation Rationale clinical judgement   Clinical Decision Making (Complexity) low complexity   Planned Therapy Interventions (PT) balance training;bed mobility training;gait training;home exercise program;neuromuscular re-education;stair training;strengthening;transfer training   Risk & Benefits of therapy have been explained evaluation/treatment results reviewed;care plan/treatment goals reviewed;risks/benefits reviewed   PT Total Evaluation Time   PT Eval, Low Complexity Minutes (03815) 5   Physical Therapy Goals   PT Frequency Daily   PT Predicted Duration/Target Date for Goal Attainment 08/23/24   PT Goals Bed Mobility;Transfers;Gait;Stairs   PT: Bed Mobility Supervision/stand-by assist   PT: Transfers Supervision/stand-by assist   PT: Gait Supervision/stand-by assist   PT: Stairs Supervision/stand-by assist;3 stairs;Rail on both sides   Interventions   Interventions Quick Adds Therapeutic Activity;Gait Training   Therapeutic Activity   Therapeutic Activities: dynamic activities to improve functional performance Minutes (61048) 10   Symptoms Noted During/After Treatment None   Treatment Detail/Skilled Intervention Cues given for log rolling and to keep legs together with bed mobility and transfers. Education given on limiting sitting to help with pain control and cues given for hand placement to promote safe and independent transfer.   Gait Training   Gait Training Minutes (50363) 10   Symptoms Noted During/After Treatment (Gait Training) none   Treatment Detail/Skilled Intervention Cues and demonstation given for penguin walk with occasional verbal cues throughout. Ambulated about 40 feet with CGA and tolerated very well. VSS on room air. Slightly bent over posture due to  pain but overall gait mechanics looked great.   Distance in Feet 40   [place] (Gait Training) contact guard   PT Discharge Planning   PT Plan progress activity as able; stairs   PT Discharge Recommendation (DC Rec) home with assist   PT Rationale for DC Rec Patient doing very well with mobility and on track for a safe discharge home.   PT Brief overview of current status A x 1   Total Session Time   Timed Code Treatment Minutes 20   Total Session Time (sum of timed and untimed services) 25

## 2024-08-16 NOTE — PHARMACY-ADMISSION MEDICATION HISTORY
"Pharmacist Admission Medication History    Admission medication history is complete. The information provided in this note is only as accurate as the sources available at the time of the update.    Information Source(s): Patient, Clinic records, and CareEverywhere/SureScripts via N/A  Pre-op RN reviewed PTA medication list with patient on 8/15/2024  Pharmacist reviewed clinic records, care everywhere and Sure scripts dispense history    Pertinent Information:   Per H/P completed 8/6/2024  spironolactone was held 1 week prior to surgery and will NOT be resumed after surgery  Estradiol injection was held 1 week prior to surgery and will be resumed 2 weeks after surgery    Changes made to PTA medication list:  Added: None  Deleted: None  Changed: None    Allergies reviewed with patient and updates made in EHR: yes in pre-op    Medication History Completed By: Lisy Thomas, Pharm.D., [facility], [facility]   8/16/2024 8:44 AM    PTA Med List   Medication Sig Last Dose    B-D DISP NEEDLE 25G X 1\" MISC USE FOR WEEKLY INJECTION 8/14/2024    B-D SYRINGE LUER-KAYLEIGH 1 ML MISC USE FOR WEEKLY ADMINISTRATION 8/14/2024    BD DISP NEEDLES 18G X 1-1/2\" MISC USE ONCE WEEKLY 8/14/2024    FLUoxetine (PROZAC) 20 MG capsule Take 30 mg by mouth daily 8/14/2024    hydrocortisone, Perianal, (ANUSOL-HC) 2.5 % cream Place rectally 2 times daily as needed More than a month    methocarbamol (ROBAXIN) 500 MG tablet Take 1 tablet (500 mg) by mouth 4 times daily as needed for muscle spasms More than a month    ondansetron (ZOFRAN) 4 MG tablet Take 1 tablet (4 mg) by mouth every 6 hours as needed for nausea More than a month    oxyCODONE (ROXICODONE) 5 MG tablet Take 1 tablet (5 mg) by mouth every 6 hours as needed for pain     SENNA-docusate sodium (SENNA S) 8.6-50 MG tablet Take 1 tablet by mouth at bedtime     traZODone (DESYREL) 50 MG tablet Take 25-50 mg by mouth at bedtime More than a month    vitamin D3 (CHOLECALCIFEROL) 1.25 MG (24606 UT) " capsule Take 50,000 Units by mouth once a week More than a month

## 2024-08-16 NOTE — PROGRESS NOTES
Admitted/transferred from: PACU  2 RN full   skin assessment completed by Laura Serra, RN and Lacey Alexis RN.  Skin assessment finding: skin intact, no problems   Interventions/actions: Education on pressure ulcer prevention reviewed, pt verbalizes understanding.      Will continue to monitor.

## 2024-08-16 NOTE — PLAN OF CARE
Goal Outcome Evaluation:    POD# 0 s/p Minimal Depth Vaginoplasty. Pt arrived from PACU at 2300. Pain well controlled with Oxycodone and Tylenol. Denies nausea, tolerating sips of water. BERTHA x 2 with minimal output. Callaway patent. Pt dangled and stood at bedside, tolerated well. Continuous capnography, SCDs in place, IS at bedside, call light in reach. Plan: Reveal on POD#2

## 2024-08-16 NOTE — PROGRESS NOTES
"Urology  Progress Note    24 hour events/Subjective:     - No acute events overnight   - Pain well controlled on current regimen   - Tolerating clear liquid diet ; no nausea or vomiting   - Not yet ambulating.    Exam  /71 (BP Location: Left arm)   Pulse 80   Temp 97.5  F (36.4  C) (Oral)   Resp 17   Ht 1.753 m (5' 9\")   Wt 83.1 kg (183 lb 3.2 oz)   SpO2 97%   BMI 27.05 kg/m    No acute distress  Unlabored breathing on RA  Abdomen soft, nontender, nondistended.  Bolster in place with moderate saturation   Labial BERTHA serosanguinous x 2     Intake/Output Summary (Last 24 hours) at 8/16/2024 0518  Last data filed at 8/16/2024 0400  Gross per 24 hour   Intake 1900 ml   Output 1040 ml   Net 860 ml         L BERTHA 85   R BERTHA 70     Labs    AM labs pending       Assessment/Plan  33 year old y/o adult POD#1 s/p minimal depth vaginoplasty.     Note - plan changes for today are in bold below.    Neuro: pain control: PRN oxycodone 5-10 mg q3h , PRN dilaudid 0.2 - 0.4 mg q2h, and scheduled tylenol   CV: DOMONIQUE   Pulm: incentive spirometry while awake  FEN/GI: CLD diet, MIVF @ 100/hr; Advance diet today   Endo: DOMONIQUE   : Continue labial drains x2 and Callaway   Heme/ID: Morning CBC   Activity: Up as tolerated  Ambulate at least TID   Ppx: SCDs, ambulation  Dispo: anticipate discharge to home.       Seen and examined with the chief resident. Will discuss with Ever Alexander MD.    Geena Paniagua MD, PGY-2  Urology Resident      PTA medications:   Prior to Admission medications    Medication Sig Start Date End Date Taking? Authorizing Provider   B-D DISP NEEDLE 25G X 1\" MISC USE FOR WEEKLY INJECTION 1/5/23  Yes Reported, Patient   B-D SYRINGE LUER-KAYLEIGH 1 ML MISC USE FOR WEEKLY ADMINISTRATION 1/4/23  Yes Reported, Patient   BD DISP NEEDLES 18G X 1-1/2\" MISC USE ONCE WEEKLY 12/6/22  Yes Reported, Patient   FLUoxetine (PROZAC) 20 MG capsule Take 30 mg by mouth daily 5/8/24  Yes Reported, Patient   hydrocortisone, " "Perianal, (ANUSOL-HC) 2.5 % cream Place rectally 2 times daily as needed 7/15/24  Yes Reported, Patient   methocarbamol (ROBAXIN) 500 MG tablet Take 1 tablet (500 mg) by mouth 4 times daily as needed for muscle spasms 7/20/23  Yes Cam Real MD   ondansetron (ZOFRAN) 4 MG tablet Take 1 tablet (4 mg) by mouth every 6 hours as needed for nausea 7/20/23  Yes Cam Real MD   oxyCODONE (ROXICODONE) 5 MG tablet Take 1 tablet (5 mg) by mouth every 6 hours as needed for pain 8/15/24 8/18/24 Yes Ever Alexander MD   SENNA-docusate sodium (SENNA S) 8.6-50 MG tablet Take 1 tablet by mouth at bedtime 8/15/24  Yes Ever Alexander MD   traZODone (DESYREL) 50 MG tablet Take 25-50 mg by mouth at bedtime 10/4/23  Yes Reported, Patient   vitamin D3 (CHOLECALCIFEROL) 1.25 MG (04555 UT) capsule Take 50,000 Units by mouth once a week 10/5/23  Yes Reported, Patient   acetaminophen (TYLENOL) 325 MG tablet Take 325-650 mg by mouth every 6 hours as needed for mild pain    Reported, Patient   estradiol valerate (DELESTROGEN) 20 MG/ML injection Inject 6 mg into the muscle once a week Mondays or Thursdays 10/19/22   Reported, Patient   progesterone (PROMETRIUM) 200 MG capsule Take 200 mg by mouth daily 10/19/22   Reported, Patient          Contacting the urology team: Please see [facility] and page on-call clinician with any questions or concerns regarding this patient. Note writer may be unavailable.     To access [employer] from intranet: under \"Applications\" --> \"Business Applications\" select \"[employer] Smartweb\" and search \"Urology Adult & Pediatric/[facility].\" Please note that any question about a urology inpatient, [place] or [place], should go to job code [id number].     "

## 2024-08-17 VITALS
DIASTOLIC BLOOD PRESSURE: 77 MMHG | HEART RATE: 91 BPM | SYSTOLIC BLOOD PRESSURE: 118 MMHG | WEIGHT: 183.2 LBS | HEIGHT: 69 IN | RESPIRATION RATE: 16 BRPM | BODY MASS INDEX: 27.13 KG/M2 | TEMPERATURE: 98.5 F | OXYGEN SATURATION: 99 %

## 2024-08-17 LAB
ANION GAP SERPL CALCULATED.3IONS-SCNC: 9 MMOL/L (ref 7–15)
BUN SERPL-MCNC: 8.1 MG/DL (ref 6–20)
CALCIUM SERPL-MCNC: 8.1 MG/DL (ref 8.8–10.4)
CHLORIDE SERPL-SCNC: 104 MMOL/L (ref 98–107)
CREAT SERPL-MCNC: 0.72 MG/DL (ref 0.51–1.17)
EGFRCR SERPLBLD CKD-EPI 2021: ABNORMAL ML/MIN/{1.73_M2}
ERYTHROCYTE [DISTWIDTH] IN BLOOD BY AUTOMATED COUNT: 12.6 % (ref 10–15)
GLUCOSE SERPL-MCNC: 95 MG/DL (ref 70–99)
HCO3 SERPL-SCNC: 24 MMOL/L (ref 22–29)
HCT VFR BLD AUTO: 33.4 % (ref 35–53)
HGB BLD-MCNC: 10.6 G/DL (ref 13.3–17.7)
MCH RBC QN AUTO: 29.5 PG (ref 26.5–33)
MCHC RBC AUTO-ENTMCNC: 31.7 G/DL (ref 31.5–36.5)
MCV RBC AUTO: 93 FL (ref 78–100)
PLATELET # BLD AUTO: 151 10E3/UL (ref 150–450)
POTASSIUM SERPL-SCNC: 3.6 MMOL/L (ref 3.4–5.3)
RBC # BLD AUTO: 3.59 10E6/UL (ref 3.8–5.9)
SODIUM SERPL-SCNC: 137 MMOL/L (ref 135–145)
WBC # BLD AUTO: 6.2 10E3/UL (ref 4–11)

## 2024-08-17 PROCEDURE — 85027 COMPLETE CBC AUTOMATED: CPT | Performed by: STUDENT IN AN ORGANIZED HEALTH CARE EDUCATION/TRAINING PROGRAM

## 2024-08-17 PROCEDURE — 82374 ASSAY BLOOD CARBON DIOXIDE: CPT | Performed by: STUDENT IN AN ORGANIZED HEALTH CARE EDUCATION/TRAINING PROGRAM

## 2024-08-17 PROCEDURE — 999N000147 HC STATISTIC PT IP EVAL DEFER

## 2024-08-17 PROCEDURE — 36415 COLL VENOUS BLD VENIPUNCTURE: CPT | Performed by: STUDENT IN AN ORGANIZED HEALTH CARE EDUCATION/TRAINING PROGRAM

## 2024-08-17 PROCEDURE — 250N000013 HC RX MED GY IP 250 OP 250 PS 637: Performed by: STUDENT IN AN ORGANIZED HEALTH CARE EDUCATION/TRAINING PROGRAM

## 2024-08-17 PROCEDURE — 250N000013 HC RX MED GY IP 250 OP 250 PS 637: Performed by: UROLOGY

## 2024-08-17 RX ORDER — OXYCODONE HYDROCHLORIDE 5 MG/1
5 TABLET ORAL EVERY 6 HOURS PRN
Qty: 12 TABLET | Refills: 0 | Status: SHIPPED | OUTPATIENT
Start: 2024-08-17 | End: 2024-08-17

## 2024-08-17 RX ORDER — OXYCODONE HYDROCHLORIDE 5 MG/1
5 TABLET ORAL EVERY 6 HOURS PRN
Qty: 14 TABLET | Refills: 0 | Status: SHIPPED | OUTPATIENT
Start: 2024-08-17

## 2024-08-17 RX ADMIN — OXYCODONE HYDROCHLORIDE 10 MG: 10 TABLET ORAL at 07:42

## 2024-08-17 RX ADMIN — FLUOXETINE HYDROCHLORIDE 30 MG: 10 CAPSULE ORAL at 07:42

## 2024-08-17 RX ADMIN — ACETAMINOPHEN 975 MG: 325 TABLET, FILM COATED ORAL at 03:55

## 2024-08-17 RX ADMIN — SENNOSIDES AND DOCUSATE SODIUM 1 TABLET: 8.6; 5 TABLET ORAL at 07:42

## 2024-08-17 RX ADMIN — TOLTERODINE 4 MG: 4 CAPSULE, EXTENDED RELEASE ORAL at 07:42

## 2024-08-17 RX ADMIN — POLYETHYLENE GLYCOL 3350 17 G: 17 POWDER, FOR SOLUTION ORAL at 07:42

## 2024-08-17 RX ADMIN — ACETAMINOPHEN 975 MG: 325 TABLET, FILM COATED ORAL at 11:07

## 2024-08-17 ASSESSMENT — ACTIVITIES OF DAILY LIVING (ADL)
ADLS_ACUITY_SCORE: 27
ADLS_ACUITY_SCORE: 23
ADLS_ACUITY_SCORE: 23
ADLS_ACUITY_SCORE: 27
ADLS_ACUITY_SCORE: 23
ADLS_ACUITY_SCORE: 25
ADLS_ACUITY_SCORE: 23
ADLS_ACUITY_SCORE: 27

## 2024-08-17 NOTE — PROGRESS NOTES
Discharge    D: Orders for discharge and outpatient medications written.    I: Home medications and return to clinic schedule reviewed with patient. Discharge instructions and parameters for calling Health Care Provider reviewed. Patient left at 1530 accompanied by parents.     A: Patient/family verbalized understanding and was ready for discharge.     P: Patient instructed to  medications in Pharmacy. Follow up as scheduled per AVS.

## 2024-08-17 NOTE — PROVIDER NOTIFICATION
Provider Notification    Re: Looking for an Oxy script, none in the chart nor does discharge Pharmacy have it.    Action: Notified Dr. Cecelia Paniagua via [facility]    Response: Provider sent signed script to Pharmacy

## 2024-08-17 NOTE — PLAN OF CARE
Physical Therapy Discharge Summary    Reason for therapy discharge:    Discharged to home.    Progress towards therapy goal(s). See goals on Care Plan in [facility] electronic health record for goal details.  Goals partially met.  Barriers to achieving goals:   discharge from facility.    Therapy recommendation(s):    No further therapy is recommended.

## 2024-08-17 NOTE — PLAN OF CARE
Goal Outcome Evaluation:      Plan of Care Reviewed With: patient    Overall Patient Progress: improvingOverall Patient Progress: improving    Afebrile. VSS on RA. Pain well managed with scheduled tylenol overnight, rating 2-3/10. Pt denies sob, n/v, dizziness. Regular diet, good appetite. PIV saline locked, fluids discontinued yesterday. BERTHA x2 with minimal bloody output. Vaginal bolster in place, moist drainage noted on bolster, chux pad and mesh underwear changed x1, ABD pad placed, no new drainage noted on pad. Activity encouraged, pt stood at bedside. +gas, no BM, +BS, senna given. Callaway in place with adequate urine output. SBA. Continue poc.

## 2024-08-17 NOTE — PROGRESS NOTES
"[id number]    /77 (BP Location: Left arm)   Pulse 91   Temp 98.5  F (36.9  C) (Oral)   Resp 16   Ht 1.753 m (5' 9\")   Wt 83.1 kg (183 lb 3.2 oz)   SpO2 99%   BMI 27.05 kg/m      POD #2, vaginal bolster with mod saturation, revealed by surgeons. Using pads for any leakage, changed x1. Callaway and Jps pulled by team, patient was able to void yellow pink tinge urine a couple hours later.    VSS. Afebrile. Denies nausea and SOB. Pain being managed with scheduled Tylenol and PRN Oxy x1. Patient states her pain intermittently increases with activity but overall she is having well controlled pain. Passing flatus, no BM this shift. Patient was eager to leave.  "

## 2024-08-17 NOTE — PROGRESS NOTES
"Urology  Progress Note    24 hour events/Subjective:     - No acute events overnight   - Pain well controlled on current regimen   - Tolerating regular diet ; no nausea or vomiting   - Ambulating     Exam  /80 (BP Location: Left arm)   Pulse 95   Temp 98.2  F (36.8  C) (Oral)   Resp 16   Ht 1.753 m (5' 9\")   Wt 83.1 kg (183 lb 3.2 oz)   SpO2 98%   BMI 27.05 kg/m    No acute distress  Unlabored breathing on RA  Abdomen soft, nontender, nondistended.  Bolster in place with moderate saturation   Labial BERTHA serosanguinous x 2     Intake/Output Summary (Last 24 hours) at 8/16/2024 0518  Last data filed at 8/16/2024 0400  Gross per 24 hour   Intake 1900 ml   Output 1040 ml   Net 860 ml     Labs    AM labs pending       Assessment/Plan  33 year old y/o adult POD#2 s/p minimal depth vaginoplasty.     Doing well. Anticipate discharge today.     Note - plan changes for today are in bold below.    Neuro: pain control: PRN oxycodone 5-10 mg q3h , PRN dilaudid 0.2 - 0.4 mg q2h, and scheduled tylenol   CV: DOMONIQUE   Pulm: incentive spirometry while awake  FEN/GI: Reg diet  Endo: DOMONIQUE   :   - Reveal and TOV today   Heme/ID: Morning CBC   Activity: Up as tolerated  Ambulate at least TID   Ppx: SCDs, ambulation  Dispo: anticipate discharge to home.       Seen and examined with the chief resident and Dr. Lim.     Geena Paniagua MD, PGY-2  Urology Resident      PTA medications:   Prior to Admission medications    Medication Sig Start Date End Date Taking? Authorizing Provider   B-D DISP NEEDLE 25G X 1\" MISC USE FOR WEEKLY INJECTION 1/5/23  Yes Reported, Patient   B-D SYRINGE LUER-KAYLEIGH 1 ML MISC USE FOR WEEKLY ADMINISTRATION 1/4/23  Yes Reported, Patient   BD DISP NEEDLES 18G X 1-1/2\" MISC USE ONCE WEEKLY 12/6/22  Yes Reported, Patient   FLUoxetine (PROZAC) 20 MG capsule Take 30 mg by mouth daily 5/8/24  Yes Reported, Patient   hydrocortisone, Perianal, (ANUSOL-HC) 2.5 % cream Place rectally 2 times daily as needed 7/15/24 " " Yes Reported, Patient   methocarbamol (ROBAXIN) 500 MG tablet Take 1 tablet (500 mg) by mouth 4 times daily as needed for muscle spasms 7/20/23  Yes Cam Real MD   ondansetron (ZOFRAN) 4 MG tablet Take 1 tablet (4 mg) by mouth every 6 hours as needed for nausea 7/20/23  Yes Cam Real MD   oxyCODONE (ROXICODONE) 5 MG tablet Take 1 tablet (5 mg) by mouth every 6 hours as needed for pain 8/15/24 8/18/24 Yes Ever Alexander MD   SENNA-docusate sodium (SENNA S) 8.6-50 MG tablet Take 1 tablet by mouth at bedtime 8/15/24  Yes Ever Alexander MD   traZODone (DESYREL) 50 MG tablet Take 25-50 mg by mouth at bedtime 10/4/23  Yes Reported, Patient   vitamin D3 (CHOLECALCIFEROL) 1.25 MG (65812 UT) capsule Take 50,000 Units by mouth once a week 10/5/23  Yes Reported, Patient   acetaminophen (TYLENOL) 325 MG tablet Take 325-650 mg by mouth every 6 hours as needed for mild pain    Reported, Patient   estradiol valerate (DELESTROGEN) 20 MG/ML injection Inject 6 mg into the muscle once a week Mondays or Thursdays 10/19/22   Reported, Patient   progesterone (PROMETRIUM) 200 MG capsule Take 200 mg by mouth daily 10/19/22   Reported, Patient      Patient was seen, evaluated and plan was formulated in conjunction with me and I agree with the above.  Fransico Lim MD      Contacting the urology team: Please see [facility] and page on-call clinician with any questions or concerns regarding this patient. Note writer may be unavailable.     To access [employer] from intranet: under \"Applications\" --> \"Business Applications\" select \"[employer] Smart[employer]\" and search \"Urology Adult & Pediatric/[facility].\" Please note that any question about a urology inpatient, [place] or [place], should go to job code [id number].     "

## 2024-08-19 ENCOUNTER — TELEPHONE (OUTPATIENT)
Dept: PLASTIC SURGERY | Facility: CLINIC | Age: 34
End: 2024-08-19
Payer: COMMERCIAL

## 2024-08-19 NOTE — TELEPHONE ENCOUNTER
Pt had minimal depth vaginoplasty on 8/15/24 with Dr. Alexander. Pt discharged from the hospital on 8/17/24. Today is POD # 4. Called to check on patient postoperatively and spoke with her father, Nathan.     Pt's father Nathan says she's in good spirits, managing pain well, and has no questions at this time. Encouraged Nathan to contact our team is questions or concerns arise.       Jacob Gates RN

## 2024-08-21 LAB
PATH REPORT.COMMENTS IMP SPEC: NORMAL
PATH REPORT.COMMENTS IMP SPEC: NORMAL
PATH REPORT.FINAL DX SPEC: NORMAL
PATH REPORT.GROSS SPEC: NORMAL
PATH REPORT.MICROSCOPIC SPEC OTHER STN: NORMAL
PATH REPORT.RELEVANT HX SPEC: NORMAL
PHOTO IMAGE: NORMAL

## 2024-08-26 NOTE — DISCHARGE SUMMARY
Discharge Summary     Angy Lino MRN# [id number]   YOB: 1990 Age: 33 year old     Date of Admission:  8/15/2024  Date of Discharge::  8/17/2024  3:46 PM  Admitting Physician:  Ever Alexander MD  Discharge Physician:  Geena Paniagua MD  Primary Care Physician:         Cam Real          Admission Diagnoses:   Gender dysphoria [F64.9]          Discharge Diagnosis:   Same as above         Procedures:   Procedure(s):  Minimal Depth Vaginoplasty        Non-operative procedures:   None performed          Consultations:   PHYSICAL THERAPY ADULT IP CONSULT           Imaging Studies:     Results for orders placed or performed during the hospital encounter of 01/31/23   MA Screen Bilateral w/Dedrick    Narrative    BILATERAL FULL FIELD DIGITAL SCREENING MAMMOGRAM WITH TOMOSYNTHESIS    Performed on: 1/31/23    No comparisons were made when reading this study.    Technique:  This study was evaluated with the assistance of Computer-Aided   Detection.  Breast Tomosynthesis was used in interpretation.    Findings: The breasts have scattered areas of fibroglandular density.    There is no radiographic evidence of malignancy.     Impression    IMPRESSION: ACR BI-RADS Category 1: Negative    RECOMMENDED FOLLOW-UP: Annual routine screening mammogram    The results and recommendations of this examination will be communicated   to the patient.        Beto Walls MD              Medications Prior to Admission:     No medications prior to admission.            Discharge Medications:     Discharge Medication List as of 8/17/2024  1:13 PM        START taking these medications    Details   SENNA-docusate sodium (SENNA S) 8.6-50 MG tablet Take 1 tablet by mouth at bedtime, Disp-7 tablet, R-0, E-Prescribe      oxyCODONE (ROXICODONE) 5 MG tablet Take 1 tablet (5 mg) by mouth every 6 hours as needed for pain, Disp-12 tablet, R-0, Local Print           CONTINUE these  "medications which have NOT CHANGED    Details   acetaminophen (TYLENOL) 325 MG tablet Take 325-650 mg by mouth every 6 hours as needed for mild pain, Historical      B-D DISP NEEDLE 25G X 1\" MISC USE FOR WEEKLY INJECTION, CHELI, Historical      B-D SYRINGE LUER-KAYLEIGH 1 ML MISC USE FOR WEEKLY ADMINISTRATION, CHELI, Historical      BD DISP NEEDLES 18G X 1-1/2\" MISC USE ONCE WEEKLY, CHELI, Historical      estradiol valerate (DELESTROGEN) 20 MG/ML injection Inject 6 mg into the muscle once a week Mondays or ThursdaysHistorical      FLUoxetine (PROZAC) 20 MG capsule Take 30 mg by mouth daily, Historical      hydrocortisone, Perianal, (ANUSOL-HC) 2.5 % cream Place rectally 2 times daily as neededHistorical      methocarbamol (ROBAXIN) 500 MG tablet Take 1 tablet (500 mg) by mouth 4 times daily as needed for muscle spasms, Disp-12 tablet, R-0, E-Prescribe      ondansetron (ZOFRAN) 4 MG tablet Take 1 tablet (4 mg) by mouth every 6 hours as needed for nausea, Disp-12 tablet, R-0, E-Prescribe      progesterone (PROMETRIUM) 200 MG capsule Take 200 mg by mouth daily, Historical      traZODone (DESYREL) 50 MG tablet Take 25-50 mg by mouth at bedtime, Historical      vitamin D3 (CHOLECALCIFEROL) 1.25 MG (35233 UT) capsule Take 50,000 Units by mouth once a week, Historical           STOP taking these medications       cephALEXin (KEFLEX) 500 MG capsule Comments:   Reason for Stopping:         spironolactone (ALDACTONE) 50 MG tablet Comments:   Reason for Stopping:                      Brief History of Illness:   Reason for admission requiring a surgical or invasive procedure:   Gender dysphoria [F64.9]   The patient underwent the following procedure(s):   See above   There were no immediate complications during this procedure.    Please refer to the full operative summary for details.           Hospital Course:   The patient's hospital course was unremarkable.  Angy Lino recovered as anticipated and experienced no post-operative " complications.  Reveal was done on POD2 with dressing takedown and Callaway removal.    On POD#2 patient was ambulating without assistance, tolerating a regular diet, had pain controlled with PO medications to go home with, and requiring no IV medications or fluids. Patient was discharged home with appropriate contact information, follow-up and instructions as seen below in the discharge paperwork.         Final Pathology Result:   None         Discharge Instructions and Follow-Up:     Discharge Procedure Orders   Reason for your hospital stay   Order Comments: Minimal depth vaginoplasty and post-op cares     Activity   Order Comments: Your activity upon discharge: see discharge instructions     Order Specific Question Answer Comments   Is discharge order? Yes      Discharge Instructions   Order Comments: Discharge instructions after vaginoplasty:     Diet:  - Regular/home diet    Activity:  - For 4 weeks, walk with small/ shuffle steps to prevent wide leg splits. Keep your knees close together. No long strides. Take stairs one at a time (for instance - right foot onto the step then left foot onto the same step).  This will avoid wide leg splits.    - Avoid strenuous exercise for 4-6 weeks.   - No lifting, pushing, pulling more than 10 pounds for 4-6 weeks. Take care when pushing with your arms to stand up.  - Do not strain your belly area.  When you bend, sit up or twice, you could strain the area around your incision.    - Avoid activities that put direct pressure on your vagina (ie. Bicycling, motorcycle, prolonged sitting, etc.)  - With sitting, use a cushion or donut.  If prolonged sitting is necessary, shift your weight onto your butt to avoid pressure on the surgical site.    - Do not strain with bowel movements.    - Do not drive until you can press the brake pedal quickly and fully without pain.   - Do not operate a motor vehicle while taking narcotic pain medications.     Medications:   1) PAIN: Oxycodone is  a Opiate medication that has been prescribed for pain. Opiates will cause sleepiness and constipation and can become addictive, therefore it is best to stop or reduce them as soon as you can. Never drive, operate machinery or drink alcoholic beverages while you are taking Opiate pain medications.  To reduce your Opiate use, take both Tylenol (acetaminophen 625mg) and ibuprofen (600mg) as directed. Do not take more than 3,200mg of Tylenol (acetaminophen/ APAP) from all sources in any 24 hour period since this can cause liver damage.  Do not take more than 2400mg of ibuprofen in any 24 hour period since this can cause kidney damage.     2) CONSTIPATION: Pericolace (senna/docusate sodium) can be taken 1-2 times daily for prevention of constipation since surgery, pain medications and bladder spasm medications can all make you constipated.  Please reduce or stop pericolace if you develop loose stools. Other over the counter solutions such as prune juice, miralax, fiber products, senna, and dulcolax can also be used. If you are taking the pericolace but still have not had a bowel movement in 3 days, start over-the-counter Milk of Magnesia taken twice daily until you have a good result.  Call the office with any concerns.     3) HORMONES:    - Please continue to HOLD (not take) your hormone therapy right now since taking it can put you at risk for blood clots in your legs and lungs. You can restart your oral hormone therapy ONE WEEK AFTER YOUR SURGICAL DATE.    1) Incisions:   - Daily showering is important, and you may get incisions wet starting 48 hours after surgery.  - Do not scrub incisions or soak in a bath, pool, hot tub, etc. for 4 weeks.  - The wound dressing should be removed 48 hours after surgery.   - It is preferable for the incision to be left uncovered, but you may cover with gauze if needed for comfort or to protect clothing from drainage.     2) Vaginal cares:  - Avoid clitoral stimulation or vaginal  "manipulation for first 4 weeks, until given clearance by surgeon.     3) Tubes/Drains:  - Your surgical drains have been removed.  While the sites are healing, change dressings once per day or more frequently if soiled or wet, to keep the site clean.  Once the sites have healed, remove dressings completely to leave the sites uncovered.      Follow-Up:   - Schedule an appointment to be seen by your primary care provider within 7-10 days of discharge for a postoperative checkup.   - Follow up with Dr. Alexander as scheduled on 8/30.  - Call or return sooner than your regularly scheduled visit if you develop any of the following:  Fever (greater than 101.3F), uncontrolled pain, uncontrolled nausea or vomiting, concerns about bowel function, as well as increased redness, swelling, drainage from your wound or any concerns about urinating or urinary catheter drainage.      Phone numbers:   - Monday through Friday 8am to 4:30pm: Call [phone] with questions, requests for medication refills, or to schedule or confirm an appointment.  - Nights, weekends, or holidays: call the after hours emergency pager - [phone] and tell the  \"I would like to page the Urology Resident on call.\"   - Please note that due to prescribing laws, resident physicians are unable to prescribe narcotics after-hours. If you feel as though you will need a refill of a narcotic pain medication, you will need to call the clinic during business hours OR seek emergency care.  - For emergencies, call [phone].     Diet   Order Comments: Follow this diet upon discharge: regular     Order Specific Question Answer Comments   Is discharge order? Yes             Discharge Disposition:     Discharged to Home      Condition at discharge: Good    --    Geena Paniagua MD  Urology Resident    7:46 AM, 8/26/2024    "

## 2024-08-30 ENCOUNTER — OFFICE VISIT (OUTPATIENT)
Dept: PLASTIC SURGERY | Facility: CLINIC | Age: 34
End: 2024-08-30
Payer: COMMERCIAL

## 2024-08-30 VITALS
SYSTOLIC BLOOD PRESSURE: 119 MMHG | HEIGHT: 69 IN | DIASTOLIC BLOOD PRESSURE: 81 MMHG | BODY MASS INDEX: 27.22 KG/M2 | OXYGEN SATURATION: 98 % | WEIGHT: 183.8 LBS | TEMPERATURE: 97.9 F | HEART RATE: 83 BPM

## 2024-08-30 DIAGNOSIS — Z87.890 STATUS POST GENDER AFFIRMATION SURGERY: Primary | ICD-10-CM

## 2024-08-30 PROCEDURE — 99024 POSTOP FOLLOW-UP VISIT: CPT | Performed by: NURSE PRACTITIONER

## 2024-08-30 ASSESSMENT — PAIN SCALES - GENERAL: PAINLEVEL: MILD PAIN (3)

## 2024-08-30 NOTE — PATIENT INSTRUCTIONS
You are healing well.    Change your pad 2-3 times each day to stay clean and dry    You can use the spray bottle to rinse your vulva after using the bathroom  It is okay to touch your vulva while washing in the shower. Just keep touch gentle    Okay to apply some unscented lotion to your skin if it is itchy  Okay to apply Aquaphor or Vaseline to your incisions if they are itchy

## 2024-08-30 NOTE — PROGRESS NOTES
"SUBJECTIVE:  Angy is a 33 year old who underwent minimal depth vaginoplasty with Dr Alexander on 8/15/2024. She is here today with her dad, christiana. She is doing well but eager to get back to her apartment and her normal routines. Pain has been manageable with mostly tylenol and ibuprofen. She has used one dose of oxycodone. She reports energy level has gone up and down, but no concerns about that.   She is having some normal drainage and changing her pad once daily in the morning. She is showering but just allowing water to flow over vulva. She feels like going pee is okay most of the time.  She is walking around parents' apartment fine without too much trouble and feels ready to get home and make her meals and do her own tasks in her space.  She has had no fever, chills, bad odor, or other problems.     OBJECTIVE:  /81 (BP Location: Left arm, Patient Position: Sitting, Cuff Size: Adult Regular)   Pulse 83   Temp 97.9  F (36.6  C) (Oral)   Ht 1.753 m (5' 9\")   Wt 83.4 kg (183 lb 12.8 oz)   SpO2 98%   BMI 27.14 kg/m     General: NAD  Vulvar incisions c/d/I with mild expected erythema along incisions  No fluctuance, pustular drainage, or strong/foul odor  Clitoris healthy. Clitoris visible between labial folds  Urethral plate healing and urethra patent  No wounds or abnormal drainage present    ASSESSMENT/PLAN:  S/P minimal depth vaginoplasty  Provided carolina-wash bottle and discussed using after bathroom and also cleaning between labia in shower.  Encouraged changing her pad at least 3 x per day to keep vulva clean and dry.  Okay to return to her apartment  Okay to use lotion on dry skin (avoid applying on incisions)  Okay to use small amount Aquaphor to incisions if itching  RTC as scheduled in one month with Dr Catherine Su, APRN, CNP    A total of 25 minutes was spent today with patient, reviewing records, completing charting, and other tasks as detailed above.   "

## 2024-08-30 NOTE — NURSING NOTE
"Chief Complaint   Patient presents with    Surgical Followup     2 week post-op, DOS 8/15/24.       Vitals:    08/30/24 1316   BP: 119/81   BP Location: Left arm   Patient Position: Sitting   Cuff Size: Adult Regular   Pulse: 83   Temp: 97.9  F (36.6  C)   TempSrc: Oral   SpO2: 98%   Weight: 83.4 kg (183 lb 12.8 oz)   Height: 1.753 m (5' 9\")       Body mass index is 27.14 kg/m .    Patient reports mild vaginal pain (3/10).    Jeanmarie Diaz, EMT    "

## 2024-08-30 NOTE — LETTER
"8/30/2024       RE: Angy Lino  [address]  [facility] [address]     Dear Colleague,    Thank you for referring your patient, Angy Lino, to the [facility] PLAST[facility] AND REC[facility]STRU[facility]GERY CLINIC [place] at [facility]. Please see a copy of my visit note below.    SUBJECTIVE:  Angy is a 33 year old who underwent minimal depth vaginoplasty with Dr Alexander on 8/15/2024. She is here today with her dad, christiana. She is doing well but eager to get back to her apartment and her normal routines. Pain has been manageable with mostly tylenol and ibuprofen. She has used one dose of oxycodone. She reports energy level has gone up and down, but no concerns about that.   She is having some normal drainage and changing her pad once daily in the morning. She is showering but just allowing water to flow over vulva. She feels like going pee is okay most of the time.  She is walking around parents' apartment fine without too much trouble and feels ready to get home and make her meals and do her own tasks in her space.  She has had no fever, chills, bad odor, or other problems.     OBJECTIVE:  /81 (BP Location: Left arm, Patient Position: Sitting, Cuff Size: Adult Regular)   Pulse 83   Temp 97.9  F (36.6  C) (Oral)   Ht 1.753 m (5' 9\")   Wt 83.4 kg (183 lb 12.8 oz)   SpO2 98%   BMI 27.14 kg/m     General: NAD  Vulvar incisions c/d/I with mild expected erythema along incisions  No fluctuance, pustular drainage, or strong/foul odor  Clitoris healthy. Clitoris visible between labial folds  Urethral plate healing and urethra patent  No wounds or abnormal drainage present    ASSESSMENT/PLAN:  S/P minimal depth vaginoplasty  Provided carolina-wash bottle and discussed using after bathroom and also cleaning between labia in shower.  Encouraged changing her pad at least 3 x per day to keep vulva clean and dry.  Okay to return to her apartment  Okay to use " lotion on dry skin (avoid applying on incisions)  Okay to use small amount Aquaphor to incisions if itching  RTC as scheduled in one month with NIDA Aguirre, CNP    A total of 25 minutes was spent today with patient, reviewing records, completing charting, and other tasks as detailed above.       Again, thank you for allowing me to participate in the care of your patient.      Sincerely,    NIDA Catalan CNP

## 2024-09-24 ENCOUNTER — OFFICE VISIT (OUTPATIENT)
Dept: PLASTIC SURGERY | Facility: CLINIC | Age: 34
End: 2024-09-24
Payer: COMMERCIAL

## 2024-09-24 VITALS
OXYGEN SATURATION: 99 % | HEIGHT: 69 IN | HEART RATE: 85 BPM | SYSTOLIC BLOOD PRESSURE: 118 MMHG | DIASTOLIC BLOOD PRESSURE: 75 MMHG | BODY MASS INDEX: 27.14 KG/M2

## 2024-09-24 DIAGNOSIS — Z87.890 STATUS POST GENDER AFFIRMATION SURGERY: Primary | ICD-10-CM

## 2024-09-24 PROCEDURE — 99024 POSTOP FOLLOW-UP VISIT: CPT | Performed by: UROLOGY

## 2024-09-24 ASSESSMENT — PAIN SCALES - GENERAL: PAINLEVEL: NO PAIN (0)

## 2024-09-24 NOTE — LETTER
"9/24/2024       RE: Angy Lino  [address] [place] [id number]     Dear Colleague,    Thank you for referring your patient, Angy Lino, to the [facility] P[facility] AND RECONSTRU[facility]IVE SURGERY CLINIC [place] at [facility]. Please see a copy of my visit note below.    SUBJECTIVE:  Angy is a 34 year old who underwent minimal depth vaginoplasty on 08/15/2024. She is doing well and very happy to be back in her apartment over the last month. She reports no blood for several weeks. No problems with urination. She is getting back to her normal routine and is looking forward to no more restrictions of recovery so she can get back to work.   She has not explored masturbation, but reports some residual numbness and dull sensation when washing in shower.  She is happy with her outcomes so far.      OBJECTIVE:  /75 (BP Location: Left arm, Patient Position: Sitting, Cuff Size: Adult Regular)   Pulse 85   Ht 1.753 m (5' 9\")   SpO2 99%   BMI 27.14 kg/m     General: NAD  Vulvar incisions c/d/I  Clitoris healthy and well-hooded      ASSESSMENT/PLAN:  S/P minimal depth vaginoplasty  Okay to RTW in 2 weeks at light duty, then increase to full duties at work one month later.   Letter provided for patient today.  RTC for 4-6 month follow-up with GILDARDO Catalan APRN, CNP    Physician Attestation  I, Ever Alexander MD, saw this patient and agree with the findings and plan of care as documented in the note.      Ever Alexander MD  Reconstructive Urology          Again, thank you for allowing me to participate in the care of your patient.      Sincerely,    Ever Alexander MD    "

## 2024-09-24 NOTE — NURSING NOTE
"Chief Complaint   Patient presents with    Surgical Followup     6 week post-op, DOS 8/15/24.       Vitals:    09/24/24 1433   BP: 118/75   BP Location: Left arm   Patient Position: Sitting   Cuff Size: Adult Regular   Pulse: 85   SpO2: 99%   Height: 1.753 m (5' 9\")       Body mass index is 27.14 kg/m .      Jeanmarie Diaz EMT    "

## 2024-09-24 NOTE — LETTER
[facility] AND REC[facility]ERY CLINIC [address]  [address]  [facility] [address]  [phone]      September 24, 2024    RE:  Angy Lino                                                                                                                                                       [address] [place] [id number]            To whom it may concern:    Angy can safely return to work starting on 10/07/2024. She will need the following restrictions during her first 4 weeks back at work. She can perform light duties including unpacking boxes, arranging products on shelves, and carrying small boxes. Weight restriction of 15 lbs max until Nov 15, 2024. She is then okay to resume her full normal range of work duties after 11/15/24.          Sincerely,        Ever Alexander MD

## 2024-09-24 NOTE — PROGRESS NOTES
"SUBJECTIVE:  Angy is a 34 year old who underwent minimal depth vaginoplasty on 08/15/2024. She is doing well and very happy to be back in her apartment over the last month. She reports no blood for several weeks. No problems with urination. She is getting back to her normal routine and is looking forward to no more restrictions of recovery so she can get back to work.   She has not explored masturbation, but reports some residual numbness and dull sensation when washing in shower.  She is happy with her outcomes so far.      OBJECTIVE:  /75 (BP Location: Left arm, Patient Position: Sitting, Cuff Size: Adult Regular)   Pulse 85   Ht 1.753 m (5' 9\")   SpO2 99%   BMI 27.14 kg/m     General: NAD  Vulvar incisions c/d/I  Clitoris healthy and well-hooded      ASSESSMENT/PLAN:  S/P minimal depth vaginoplasty  Okay to RTW in 2 weeks at light duty, then increase to full duties at work one month later.   Letter provided for patient today.  RTC for 4-6 month follow-up with GILDARDO Catalan APRN, CNP    Physician Attestation   I, Ever Alexander MD, saw this patient and agree with the findings and plan of care as documented in the note.      Ever Alexander MD  Reconstructive Urology        "

## 2024-12-15 ENCOUNTER — HEALTH MAINTENANCE LETTER (OUTPATIENT)
Age: 34
End: 2024-12-15

## (undated) DEVICE — SUCTION TIP YANKAUER W/O VENT K86

## (undated) DEVICE — PLUG CATH AND DRAIN TUBE PROTECTOR DYND12200

## (undated) DEVICE — DRSG TEGADERM 4X4 3/4" 1626W

## (undated) DEVICE — ESU ELEC BLADE 2.75" COATED/INSULATED E1455

## (undated) DEVICE — SPONGE LAP 18X18" X8435

## (undated) DEVICE — DRAIN JACKSON PRATT 10FR ROUND SU130-1321

## (undated) DEVICE — SU VICRYL 2-0 CT-2 27" UND J269H

## (undated) DEVICE — SOL NACL 0.9% IRRIG 500ML BOTTLE 2F7123

## (undated) DEVICE — TUBING SUCTION 10'X3/16" N510

## (undated) DEVICE — LINEN TOWEL PACK X5 5464

## (undated) DEVICE — PHOTON GUIDE INVUITY WIDE FLAT 104015

## (undated) DEVICE — DRAPE SHEET REV FOLD 3/4 9349

## (undated) DEVICE — DRSG XEROFORM 5X9" CUR253590W

## (undated) DEVICE — DRSG KERLIX 4 1/2"X4YDS ROLL 6715

## (undated) DEVICE — DRAPE POUCH IRR 1016

## (undated) DEVICE — SU PDS II 2-0 CT-1 27" Z339H

## (undated) DEVICE — GLOVE BIOGEL PI MICRO SZ 7.5 48575

## (undated) DEVICE — PREP CHLORAPREP 26ML TINTED ORANGE  260815

## (undated) DEVICE — DRSG KERLIX 4 1/2"X4YDS ROLL 6730

## (undated) DEVICE — DRSG PRIMAPORE 02X3" 7133

## (undated) DEVICE — DRAPE POUCH INSTRUMENT 1018

## (undated) DEVICE — SUCTION SLEEVE NEPTUNE 2 165MM 0703-005-165

## (undated) DEVICE — STPL SKIN 35W 059037

## (undated) DEVICE — DRAPE LEGGINGS CLEAR 8430

## (undated) DEVICE — SU PDS II 0 CT-1 27" Z340H

## (undated) DEVICE — TAPE MEDIPORE 2"X2YD 2962S

## (undated) DEVICE — KELLER FUNNEL

## (undated) DEVICE — PREP POVIDONE IODINE SOLUTION 10% 120ML

## (undated) DEVICE — SURGICEL POWDER ABSORBABLE HEMOSTAT 3GM 3013SP

## (undated) DEVICE — BOWL 32OZ STERILE 01232

## (undated) DEVICE — NDL COUNTER 20CT 31142493

## (undated) DEVICE — SU SILK 0 SH 30" K834H

## (undated) DEVICE — LINEN TOWEL PACK X6 WHITE 5487

## (undated) DEVICE — DRAPE IOBAN INCISE 23X17" 6650EZ

## (undated) DEVICE — SU PROLENE 0 CT-1 30" 8424H

## (undated) DEVICE — TAPE MICROFOAM 3" 1528-3

## (undated) DEVICE — ESU GROUND PAD ADULT W/CORD E7507

## (undated) DEVICE — ESU PENCIL SMOKE EVAC W/ROCKER SWITCH 0703-047-000

## (undated) DEVICE — BLADE KNIFE SURG 15 371115

## (undated) DEVICE — SOL WATER IRRIG 500ML BOTTLE 2F7113

## (undated) DEVICE — PREP POVIDONE-IODINE 7.5% SCRUB 4OZ BOTTLE MDS093945

## (undated) DEVICE — SU VICRYL+ 3-0 27IN SH UND VCP416H

## (undated) DEVICE — PACK MINOR CUSTOM ASC

## (undated) DEVICE — PREP POVIDONE-IODINE 10% SOLUTION 4OZ BOTTLE MDS093944

## (undated) DEVICE — SU MONOCRYL 3-0 SH 27" UND Y416H

## (undated) DEVICE — DRAIN JACKSON PRATT RESERVOIR 100ML SU130-1305

## (undated) DEVICE — LINEN TOWEL PACK X30 5481

## (undated) DEVICE — SU DERMABOND ADVANCED .7ML DNX12

## (undated) DEVICE — DRSG STERI STRIP 1/2X4" R1547

## (undated) DEVICE — BASIN SET MINOR DISP

## (undated) DEVICE — SUCTION MANIFOLD NEPTUNE 2 SYS 1 PORT 702-025-000

## (undated) DEVICE — SU MONOCRYL 5-0 P-3 18" UND Y493G

## (undated) DEVICE — ESU ELEC BLADE 6" COATED/INSULATED E1455-6

## (undated) DEVICE — SU PDS II 3-0 FS-1 27" CLR  Z442H

## (undated) DEVICE — ESU LIGASURE DISSECTOR EXACT LF2019

## (undated) DEVICE — SU ETHILON 3-0 PS-1 18" 1663H

## (undated) DEVICE — SU PDS II 3-0 SH 27" Z316H

## (undated) DEVICE — Device

## (undated) DEVICE — DRAPE LAP W/ARMBOARD 29410

## (undated) DEVICE — CATH FOLYSIL 16FR 15ML AA6116

## (undated) DEVICE — SYR 50ML LL W/O NDL 309653

## (undated) DEVICE — SU MONOCRYL 3-0 PS-1 27" Y936H

## (undated) DEVICE — DRAPE U SPLIT 74X120" 29440

## (undated) DEVICE — DRSG KERLIX FLUFFS X5

## (undated) DEVICE — SYR BULB IRRIG DOVER 60 ML LATEX FREE 67000

## (undated) DEVICE — LABEL MEDICATION SYSTEM 3303-P

## (undated) DEVICE — ESU CORD BIPOLAR GREEN 10-4000

## (undated) DEVICE — SUCTION MANIFOLD NEPTUNE 2 SYS 4 PORT 0702-020-000

## (undated) RX ORDER — BUPIVACAINE HYDROCHLORIDE 2.5 MG/ML
INJECTION, SOLUTION INFILTRATION; PERINEURAL
Status: DISPENSED
Start: 2023-07-20

## (undated) RX ORDER — SODIUM CHLORIDE 9 MG/ML
INJECTION, SOLUTION INTRAVENOUS
Status: DISPENSED
Start: 2024-08-15

## (undated) RX ORDER — ONDANSETRON 2 MG/ML
INJECTION INTRAMUSCULAR; INTRAVENOUS
Status: DISPENSED
Start: 2023-07-20

## (undated) RX ORDER — DEXAMETHASONE SODIUM PHOSPHATE 10 MG/ML
INJECTION, SOLUTION INTRAMUSCULAR; INTRAVENOUS
Status: DISPENSED
Start: 2023-07-20

## (undated) RX ORDER — BUPIVACAINE HYDROCHLORIDE AND EPINEPHRINE 2.5; 5 MG/ML; UG/ML
INJECTION, SOLUTION EPIDURAL; INFILTRATION; INTRACAUDAL; PERINEURAL
Status: DISPENSED
Start: 2024-08-15

## (undated) RX ORDER — CEFAZOLIN SODIUM 1 G/3ML
INJECTION, POWDER, FOR SOLUTION INTRAMUSCULAR; INTRAVENOUS
Status: DISPENSED
Start: 2023-07-20

## (undated) RX ORDER — HYDROMORPHONE HCL IN WATER/PF 6 MG/30 ML
PATIENT CONTROLLED ANALGESIA SYRINGE INTRAVENOUS
Status: DISPENSED
Start: 2024-08-15

## (undated) RX ORDER — FENTANYL CITRATE-0.9 % NACL/PF 10 MCG/ML
PLASTIC BAG, INJECTION (ML) INTRAVENOUS
Status: DISPENSED
Start: 2024-08-15

## (undated) RX ORDER — FENTANYL CITRATE 50 UG/ML
INJECTION, SOLUTION INTRAMUSCULAR; INTRAVENOUS
Status: DISPENSED
Start: 2024-08-15

## (undated) RX ORDER — GENTAMICIN 40 MG/ML
INJECTION, SOLUTION INTRAMUSCULAR; INTRAVENOUS
Status: DISPENSED
Start: 2023-07-20

## (undated) RX ORDER — PROPOFOL 10 MG/ML
INJECTION, EMULSION INTRAVENOUS
Status: DISPENSED
Start: 2023-07-20

## (undated) RX ORDER — DEXMEDETOMIDINE HYDROCHLORIDE 4 UG/ML
INJECTION, SOLUTION INTRAVENOUS
Status: DISPENSED
Start: 2023-07-20

## (undated) RX ORDER — LIDOCAINE HYDROCHLORIDE AND EPINEPHRINE 10; 10 MG/ML; UG/ML
INJECTION, SOLUTION INFILTRATION; PERINEURAL
Status: DISPENSED
Start: 2023-07-20

## (undated) RX ORDER — CEFAZOLIN SODIUM/WATER 2 G/20 ML
SYRINGE (ML) INTRAVENOUS
Status: DISPENSED
Start: 2024-08-15

## (undated) RX ORDER — FENTANYL CITRATE 50 UG/ML
INJECTION, SOLUTION INTRAMUSCULAR; INTRAVENOUS
Status: DISPENSED
Start: 2023-07-20

## (undated) RX ORDER — DEXAMETHASONE SODIUM PHOSPHATE 4 MG/ML
INJECTION, SOLUTION INTRA-ARTICULAR; INTRALESIONAL; INTRAMUSCULAR; INTRAVENOUS; SOFT TISSUE
Status: DISPENSED
Start: 2024-08-15

## (undated) RX ORDER — ACETAMINOPHEN 325 MG/1
TABLET ORAL
Status: DISPENSED
Start: 2023-07-20

## (undated) RX ORDER — GABAPENTIN 300 MG/1
CAPSULE ORAL
Status: DISPENSED
Start: 2023-07-20

## (undated) RX ORDER — CEFAZOLIN SODIUM 2 G/50ML
SOLUTION INTRAVENOUS
Status: DISPENSED
Start: 2023-07-20

## (undated) RX ORDER — ONDANSETRON 2 MG/ML
INJECTION INTRAMUSCULAR; INTRAVENOUS
Status: DISPENSED
Start: 2024-08-15

## (undated) RX ORDER — DEXAMETHASONE SODIUM PHOSPHATE 4 MG/ML
INJECTION, SOLUTION INTRA-ARTICULAR; INTRALESIONAL; INTRAMUSCULAR; INTRAVENOUS; SOFT TISSUE
Status: DISPENSED
Start: 2023-07-20

## (undated) RX ORDER — OXYCODONE HYDROCHLORIDE 5 MG/1
TABLET ORAL
Status: DISPENSED
Start: 2024-08-15

## (undated) RX ORDER — HEPARIN SODIUM 5000 [USP'U]/.5ML
INJECTION, SOLUTION INTRAVENOUS; SUBCUTANEOUS
Status: DISPENSED
Start: 2024-08-15

## (undated) RX ORDER — HYDROMORPHONE HYDROCHLORIDE 1 MG/ML
INJECTION, SOLUTION INTRAMUSCULAR; INTRAVENOUS; SUBCUTANEOUS
Status: DISPENSED
Start: 2024-08-15

## (undated) RX ORDER — ACETAMINOPHEN 325 MG/1
TABLET ORAL
Status: DISPENSED
Start: 2024-08-15

## (undated) RX ORDER — PROPOFOL 10 MG/ML
INJECTION, EMULSION INTRAVENOUS
Status: DISPENSED
Start: 2024-08-15